# Patient Record
Sex: FEMALE | Race: BLACK OR AFRICAN AMERICAN | NOT HISPANIC OR LATINO
[De-identification: names, ages, dates, MRNs, and addresses within clinical notes are randomized per-mention and may not be internally consistent; named-entity substitution may affect disease eponyms.]

---

## 2017-06-13 ENCOUNTER — APPOINTMENT (OUTPATIENT)
Dept: FAMILY MEDICINE | Facility: CLINIC | Age: 62
End: 2017-06-13

## 2017-06-13 ENCOUNTER — MEDICATION RENEWAL (OUTPATIENT)
Age: 62
End: 2017-06-13

## 2017-06-13 VITALS
HEIGHT: 66 IN | DIASTOLIC BLOOD PRESSURE: 80 MMHG | WEIGHT: 182 LBS | SYSTOLIC BLOOD PRESSURE: 134 MMHG | BODY MASS INDEX: 29.25 KG/M2

## 2017-06-13 DIAGNOSIS — Z80.1 FAMILY HISTORY OF MALIGNANT NEOPLASM OF TRACHEA, BRONCHUS AND LUNG: ICD-10-CM

## 2017-06-13 DIAGNOSIS — H40.9 UNSPECIFIED GLAUCOMA: ICD-10-CM

## 2017-06-14 LAB
25(OH)D3 SERPL-MCNC: 22.3 NG/ML
ALBUMIN SERPL ELPH-MCNC: 4.7 G/DL
ALP BLD-CCNC: 61 U/L
ALT SERPL-CCNC: 19 U/L
ANION GAP SERPL CALC-SCNC: 17 MMOL/L
AST SERPL-CCNC: 14 U/L
BILIRUB SERPL-MCNC: 0.4 MG/DL
BUN SERPL-MCNC: 12 MG/DL
CALCIUM SERPL-MCNC: 10.5 MG/DL
CHLORIDE SERPL-SCNC: 106 MMOL/L
CHOLEST SERPL-MCNC: 215 MG/DL
CHOLEST/HDLC SERPL: 3.3 RATIO
CO2 SERPL-SCNC: 23 MMOL/L
CREAT SERPL-MCNC: 1.05 MG/DL
GLUCOSE SERPL-MCNC: 121 MG/DL
HBA1C MFR BLD HPLC: 6.7 %
HDLC SERPL-MCNC: 66 MG/DL
LDLC SERPL CALC-MCNC: 130 MG/DL
POTASSIUM SERPL-SCNC: 4 MMOL/L
PROT SERPL-MCNC: 8.4 G/DL
SODIUM SERPL-SCNC: 146 MMOL/L
TRIGL SERPL-MCNC: 95 MG/DL

## 2017-09-19 ENCOUNTER — RX RENEWAL (OUTPATIENT)
Age: 62
End: 2017-09-19

## 2020-01-07 ENCOUNTER — APPOINTMENT (OUTPATIENT)
Dept: ORTHOPEDIC SURGERY | Facility: CLINIC | Age: 65
End: 2020-01-07
Payer: COMMERCIAL

## 2020-01-07 VITALS
SYSTOLIC BLOOD PRESSURE: 142 MMHG | HEART RATE: 80 BPM | DIASTOLIC BLOOD PRESSURE: 88 MMHG | BODY MASS INDEX: 29.25 KG/M2 | WEIGHT: 182 LBS | HEIGHT: 66 IN

## 2020-01-07 DIAGNOSIS — Z78.9 OTHER SPECIFIED HEALTH STATUS: ICD-10-CM

## 2020-01-07 DIAGNOSIS — Z86.39 PERSONAL HISTORY OF OTHER ENDOCRINE, NUTRITIONAL AND METABOLIC DISEASE: ICD-10-CM

## 2020-01-07 PROCEDURE — 99204 OFFICE O/P NEW MOD 45 MIN: CPT

## 2020-01-07 NOTE — DISCUSSION/SUMMARY
[de-identified] : Patient was shown her x-rays and that of the situation detail. She was informed she has moderate arthritis of her right knee and was referred to physical therapy. She will continue taking her diclofenac gel as well as her Motrin. Patient declined a cortisone injection at this time.\par \par Followup in 3 weeks if she is still symptomatic we may consider Visco supplementation

## 2020-01-07 NOTE — HISTORY OF PRESENT ILLNESS
[Worsening] : worsening [___ mths] : [unfilled] month(s) ago [4] : a maximum pain level of 4/10 [Intermit.] : ~He/She~ states the symptoms seem to be intermittent [Walking] : worsened by walking [Heat] : relieved by heat [Rest] : relieved by rest [de-identified] : Pt presents for initial evaluation with pain in her right knee. Pt has no known injury. Pt is apply diclofenac gel for pain. Pt was seen by her PCP who ordered an xray of her right knee, taken 12/31/19 [place]. [de-identified] : medication

## 2020-01-07 NOTE — PHYSICAL EXAM
[de-identified] : Physical examination of the right knee discloses mild tenderness to the medial joint line palpation. Relatively pain-free motion between ° no acute instability or neurovascular deficits no acute effusions [de-identified] : Submitted x-rays from December 31, 2019 discloses mild to moderate medial joint space narrowing right knee

## 2020-01-13 ENCOUNTER — APPOINTMENT (OUTPATIENT)
Dept: ORTHOPEDIC SURGERY | Facility: CLINIC | Age: 65
End: 2020-01-13
Payer: COMMERCIAL

## 2020-01-13 VITALS
BODY MASS INDEX: 29.25 KG/M2 | DIASTOLIC BLOOD PRESSURE: 78 MMHG | HEART RATE: 88 BPM | WEIGHT: 182 LBS | SYSTOLIC BLOOD PRESSURE: 149 MMHG | HEIGHT: 66 IN

## 2020-01-13 DIAGNOSIS — M17.11 UNILATERAL PRIMARY OSTEOARTHRITIS, RIGHT KNEE: ICD-10-CM

## 2020-01-13 PROCEDURE — 99214 OFFICE O/P EST MOD 30 MIN: CPT | Mod: 25

## 2020-01-13 PROCEDURE — 20611 DRAIN/INJ JOINT/BURSA W/US: CPT | Mod: RT

## 2020-01-13 NOTE — PROCEDURE
[de-identified] : Under sterile technique using ultrasound guided needle placement the right knee was injected with Depo-Medrol 40 mg and lidocaine.\par \par The patient tolerated procedure well no complications occurred and she left the office in stable condition.

## 2020-01-13 NOTE — PHYSICAL EXAM
[de-identified] : On physical examination of the right knee she is Noted to have medial joint line tenderness with limited motion.

## 2020-01-13 NOTE — DISCUSSION/SUMMARY
[de-identified] : Along the patient's cortisone injection she was given appropriate post followup instructions accordingly. Continue physical therapy and will return in 3-4 weeks symptomatic. Further treatment may consist of Visco supplementation

## 2020-01-13 NOTE — HISTORY OF PRESENT ILLNESS
[Worsening] : worsening [___ mths] : [unfilled] month(s) ago [Walking] : worsened by walking [Intermit.] : ~He/She~ states the symptoms seem to be intermittent [None] : No relieving factors are noted [6] : a minimum pain level of 6/10 [de-identified] : Pt returns for follow up for pain in her right knee. 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 Pt is taking Naprosyn.

## 2020-02-03 ENCOUNTER — RESULT REVIEW (OUTPATIENT)
Age: 65
End: 2020-02-03

## 2020-04-01 ENCOUNTER — INPATIENT (INPATIENT)
Facility: HOSPITAL | Age: 65
LOS: 3 days | Discharge: HOME | DRG: 177 | End: 2020-04-05
Attending: INTERNAL MEDICINE | Admitting: HOSPITALIST
Payer: COMMERCIAL

## 2020-04-01 VITALS
OXYGEN SATURATION: 95 % | DIASTOLIC BLOOD PRESSURE: 85 MMHG | RESPIRATION RATE: 26 BRPM | SYSTOLIC BLOOD PRESSURE: 156 MMHG | HEART RATE: 122 BPM | TEMPERATURE: 101 F

## 2020-04-01 DIAGNOSIS — R68.89 OTHER GENERAL SYMPTOMS AND SIGNS: ICD-10-CM

## 2020-04-01 LAB
ALBUMIN SERPL ELPH-MCNC: 3.9 G/DL — SIGNIFICANT CHANGE UP (ref 3.3–5)
ALP SERPL-CCNC: 49 U/L — SIGNIFICANT CHANGE UP (ref 40–120)
ALT FLD-CCNC: 23 U/L — SIGNIFICANT CHANGE UP (ref 10–45)
ANION GAP SERPL CALC-SCNC: 18 MMOL/L — HIGH (ref 5–17)
APTT BLD: 35.3 SEC — SIGNIFICANT CHANGE UP (ref 27.5–36.3)
AST SERPL-CCNC: 29 U/L — SIGNIFICANT CHANGE UP (ref 10–40)
BASE EXCESS BLDV CALC-SCNC: 1.9 MMOL/L — SIGNIFICANT CHANGE UP (ref -2–2)
BASOPHILS # BLD AUTO: 0.03 K/UL — SIGNIFICANT CHANGE UP (ref 0–0.2)
BASOPHILS NFR BLD AUTO: 0.3 % — SIGNIFICANT CHANGE UP (ref 0–2)
BILIRUB SERPL-MCNC: 0.4 MG/DL — SIGNIFICANT CHANGE UP (ref 0.2–1.2)
BUN SERPL-MCNC: 21 MG/DL — SIGNIFICANT CHANGE UP (ref 7–23)
CA-I SERPL-SCNC: 1.19 MMOL/L — SIGNIFICANT CHANGE UP (ref 1.12–1.3)
CALCIUM SERPL-MCNC: 9.7 MG/DL — SIGNIFICANT CHANGE UP (ref 8.4–10.5)
CHLORIDE BLDV-SCNC: 104 MMOL/L — SIGNIFICANT CHANGE UP (ref 96–108)
CHLORIDE SERPL-SCNC: 100 MMOL/L — SIGNIFICANT CHANGE UP (ref 96–108)
CK SERPL-CCNC: 366 U/L — HIGH (ref 25–170)
CO2 BLDV-SCNC: 28 MMOL/L — SIGNIFICANT CHANGE UP (ref 22–30)
CO2 SERPL-SCNC: 22 MMOL/L — SIGNIFICANT CHANGE UP (ref 22–31)
CREAT SERPL-MCNC: 1.39 MG/DL — HIGH (ref 0.5–1.3)
CRP SERPL-MCNC: 33.55 MG/DL — HIGH (ref 0–0.4)
D DIMER BLD IA.RAPID-MCNC: 342 NG/ML DDU — HIGH
EOSINOPHIL # BLD AUTO: 0.01 K/UL — SIGNIFICANT CHANGE UP (ref 0–0.5)
EOSINOPHIL NFR BLD AUTO: 0.1 % — SIGNIFICANT CHANGE UP (ref 0–6)
ERYTHROCYTE [SEDIMENTATION RATE] IN BLOOD: 120 MM/HR — HIGH (ref 0–20)
FERRITIN SERPL-MCNC: 921 NG/ML — HIGH (ref 15–150)
GAS PNL BLDV: 141 MMOL/L — SIGNIFICANT CHANGE UP (ref 135–145)
GAS PNL BLDV: SIGNIFICANT CHANGE UP
GLUCOSE BLDV-MCNC: 153 MG/DL — HIGH (ref 70–99)
GLUCOSE SERPL-MCNC: 166 MG/DL — HIGH (ref 70–99)
HCO3 BLDV-SCNC: 26 MMOL/L — SIGNIFICANT CHANGE UP (ref 21–29)
HCT VFR BLD CALC: 41 % — SIGNIFICANT CHANGE UP (ref 34.5–45)
HCT VFR BLDA CALC: 44 % — SIGNIFICANT CHANGE UP (ref 39–50)
HGB BLD CALC-MCNC: 14.4 G/DL — SIGNIFICANT CHANGE UP (ref 11.5–15.5)
HGB BLD-MCNC: 12.9 G/DL — SIGNIFICANT CHANGE UP (ref 11.5–15.5)
IMM GRANULOCYTES NFR BLD AUTO: 1 % — SIGNIFICANT CHANGE UP (ref 0–1.5)
INR BLD: 1.31 RATIO — HIGH (ref 0.88–1.16)
LACTATE BLDV-MCNC: 2 MMOL/L — SIGNIFICANT CHANGE UP (ref 0.7–2)
LDH SERPL L TO P-CCNC: 259 U/L — HIGH (ref 50–242)
LYMPHOCYTES # BLD AUTO: 0.98 K/UL — LOW (ref 1–3.3)
LYMPHOCYTES # BLD AUTO: 8.8 % — LOW (ref 13–44)
MCHC RBC-ENTMCNC: 28 PG — SIGNIFICANT CHANGE UP (ref 27–34)
MCHC RBC-ENTMCNC: 31.5 GM/DL — LOW (ref 32–36)
MCV RBC AUTO: 89.1 FL — SIGNIFICANT CHANGE UP (ref 80–100)
MONOCYTES # BLD AUTO: 0.35 K/UL — SIGNIFICANT CHANGE UP (ref 0–0.9)
MONOCYTES NFR BLD AUTO: 3.1 % — SIGNIFICANT CHANGE UP (ref 2–14)
NEUTROPHILS # BLD AUTO: 9.68 K/UL — HIGH (ref 1.8–7.4)
NEUTROPHILS NFR BLD AUTO: 86.7 % — HIGH (ref 43–77)
NRBC # BLD: 0 /100 WBCS — SIGNIFICANT CHANGE UP (ref 0–0)
PCO2 BLDV: 43 MMHG — SIGNIFICANT CHANGE UP (ref 35–50)
PH BLDV: 7.4 — SIGNIFICANT CHANGE UP (ref 7.35–7.45)
PLATELET # BLD AUTO: 286 K/UL — SIGNIFICANT CHANGE UP (ref 150–400)
PO2 BLDV: 24 MMHG — LOW (ref 25–45)
POTASSIUM BLDV-SCNC: 3.3 MMOL/L — LOW (ref 3.5–5.3)
POTASSIUM SERPL-MCNC: 3.5 MMOL/L — SIGNIFICANT CHANGE UP (ref 3.5–5.3)
POTASSIUM SERPL-SCNC: 3.5 MMOL/L — SIGNIFICANT CHANGE UP (ref 3.5–5.3)
PROCALCITONIN SERPL-MCNC: 0.54 NG/ML — HIGH (ref 0.02–0.1)
PROT SERPL-MCNC: 8.9 G/DL — HIGH (ref 6–8.3)
PROTHROM AB SERPL-ACNC: 15.2 SEC — HIGH (ref 10–12.9)
RBC # BLD: 4.6 M/UL — SIGNIFICANT CHANGE UP (ref 3.8–5.2)
RBC # FLD: 13.2 % — SIGNIFICANT CHANGE UP (ref 10.3–14.5)
SAO2 % BLDV: 36 % — LOW (ref 67–88)
SODIUM SERPL-SCNC: 140 MMOL/L — SIGNIFICANT CHANGE UP (ref 135–145)
TROPONIN T, HIGH SENSITIVITY RESULT: 11 NG/L — SIGNIFICANT CHANGE UP (ref 0–51)
WBC # BLD: 11.16 K/UL — HIGH (ref 3.8–10.5)
WBC # FLD AUTO: 11.16 K/UL — HIGH (ref 3.8–10.5)

## 2020-04-01 PROCEDURE — 99285 EMERGENCY DEPT VISIT HI MDM: CPT

## 2020-04-01 PROCEDURE — 71045 X-RAY EXAM CHEST 1 VIEW: CPT | Mod: 26

## 2020-04-01 RX ORDER — HYDROXYCHLOROQUINE SULFATE 200 MG
TABLET ORAL
Refills: 0 | Status: DISCONTINUED | OUTPATIENT
Start: 2020-04-01 | End: 2020-04-05

## 2020-04-01 RX ORDER — ACETAMINOPHEN 500 MG
975 TABLET ORAL ONCE
Refills: 0 | Status: COMPLETED | OUTPATIENT
Start: 2020-04-01 | End: 2020-04-01

## 2020-04-01 RX ORDER — ONDANSETRON 8 MG/1
4 TABLET, FILM COATED ORAL EVERY 6 HOURS
Refills: 0 | Status: DISCONTINUED | OUTPATIENT
Start: 2020-04-01 | End: 2020-04-05

## 2020-04-01 RX ORDER — METFORMIN HYDROCHLORIDE 850 MG/1
1 TABLET ORAL
Qty: 0 | Refills: 0 | DISCHARGE

## 2020-04-01 RX ORDER — HYDROXYCHLOROQUINE SULFATE 200 MG
400 TABLET ORAL EVERY 12 HOURS
Refills: 0 | Status: COMPLETED | OUTPATIENT
Start: 2020-04-01 | End: 2020-04-02

## 2020-04-01 RX ORDER — ENOXAPARIN SODIUM 100 MG/ML
40 INJECTION SUBCUTANEOUS DAILY
Refills: 0 | Status: DISCONTINUED | OUTPATIENT
Start: 2020-04-01 | End: 2020-04-05

## 2020-04-01 RX ORDER — ATORVASTATIN CALCIUM 80 MG/1
20 TABLET, FILM COATED ORAL AT BEDTIME
Refills: 0 | Status: DISCONTINUED | OUTPATIENT
Start: 2020-04-01 | End: 2020-04-05

## 2020-04-01 RX ORDER — INSULIN LISPRO 100/ML
VIAL (ML) SUBCUTANEOUS
Refills: 0 | Status: DISCONTINUED | OUTPATIENT
Start: 2020-04-01 | End: 2020-04-05

## 2020-04-01 RX ORDER — HYDROXYCHLOROQUINE SULFATE 200 MG
200 TABLET ORAL EVERY 12 HOURS
Refills: 0 | Status: DISCONTINUED | OUTPATIENT
Start: 2020-04-02 | End: 2020-04-05

## 2020-04-01 RX ORDER — GLUCAGON INJECTION, SOLUTION 0.5 MG/.1ML
1 INJECTION, SOLUTION SUBCUTANEOUS ONCE
Refills: 0 | Status: DISCONTINUED | OUTPATIENT
Start: 2020-04-01 | End: 2020-04-05

## 2020-04-01 RX ORDER — DEXTROSE 50 % IN WATER 50 %
15 SYRINGE (ML) INTRAVENOUS ONCE
Refills: 0 | Status: DISCONTINUED | OUTPATIENT
Start: 2020-04-01 | End: 2020-04-05

## 2020-04-01 RX ORDER — SODIUM CHLORIDE 9 MG/ML
500 INJECTION INTRAMUSCULAR; INTRAVENOUS; SUBCUTANEOUS ONCE
Refills: 0 | Status: COMPLETED | OUTPATIENT
Start: 2020-04-01 | End: 2020-04-01

## 2020-04-01 RX ORDER — SODIUM CHLORIDE 9 MG/ML
1000 INJECTION, SOLUTION INTRAVENOUS
Refills: 0 | Status: DISCONTINUED | OUTPATIENT
Start: 2020-04-01 | End: 2020-04-05

## 2020-04-01 RX ORDER — ACETAMINOPHEN 500 MG
650 TABLET ORAL EVERY 4 HOURS
Refills: 0 | Status: DISCONTINUED | OUTPATIENT
Start: 2020-04-01 | End: 2020-04-05

## 2020-04-01 RX ORDER — DEXTROSE 50 % IN WATER 50 %
12.5 SYRINGE (ML) INTRAVENOUS ONCE
Refills: 0 | Status: DISCONTINUED | OUTPATIENT
Start: 2020-04-01 | End: 2020-04-05

## 2020-04-01 RX ADMIN — ATORVASTATIN CALCIUM 20 MILLIGRAM(S): 80 TABLET, FILM COATED ORAL at 22:28

## 2020-04-01 RX ADMIN — Medication 650 MILLIGRAM(S): at 22:29

## 2020-04-01 RX ADMIN — SODIUM CHLORIDE 500 MILLILITER(S): 9 INJECTION INTRAMUSCULAR; INTRAVENOUS; SUBCUTANEOUS at 15:20

## 2020-04-01 RX ADMIN — Medication 400 MILLIGRAM(S): at 22:29

## 2020-04-01 RX ADMIN — Medication 975 MILLIGRAM(S): at 15:19

## 2020-04-01 NOTE — ED PROVIDER NOTE - CHILD ABUSE FACILITY
Problem: Skin Injury Risk (Adult)  Goal: Skin Health and Integrity  Outcome: Ongoing (interventions implemented as appropriate)      Problem: Wound (Includes Pressure Injury) (Adult)  Goal: Signs and Symptoms of Listed Potential Problems Will be Absent, Minimized or Managed (Wound)  Outcome: Ongoing (interventions implemented as appropriate)      Problem: Sepsis/Septic Shock (Adult)  Goal: Signs and Symptoms of Listed Potential Problems Will be Absent, Minimized or Managed (Sepsis/Septic Shock)  Outcome: Ongoing (interventions implemented as appropriate)      Problem: Renal Failure/Kidney Injury, Acute (Adult)  Goal: Signs and Symptoms of Listed Potential Problems Will be Absent, Minimized or Managed (Renal Failure/Kidney Injury, Acute)  Outcome: Ongoing (interventions implemented as appropriate)      Problem: Fall Risk (Adult)  Goal: Identify Related Risk Factors and Signs and Symptoms  Outcome: Ongoing (interventions implemented as appropriate)    Goal: Absence of Fall  Outcome: Ongoing (interventions implemented as appropriate)      Problem: Skin and Soft Tissue Infection (Adult)  Goal: Signs and Symptoms of Listed Potential Problems Will be Absent, Minimized or Managed (Skin and Soft Tissue Infection)  Outcome: Ongoing (interventions implemented as appropriate)      Problem: Patient Care Overview  Goal: Individualization and Mutuality  Outcome: Ongoing (interventions implemented as appropriate)    Goal: Discharge Needs Assessment  Outcome: Ongoing (interventions implemented as appropriate)    Goal: Interprofessional Rounds/Family Conf  Outcome: Ongoing (interventions implemented as appropriate)      Problem: NPPV/CPAP (Adult)  Goal: Signs and Symptoms of Listed Potential Problems Will be Absent, Minimized or Managed (NPPV/CPAP)  Outcome: Ongoing (interventions implemented as appropriate)         [facility]

## 2020-04-01 NOTE — ED PROVIDER NOTE - CLINICAL SUMMARY MEDICAL DECISION MAKING FREE TEXT BOX
Attending MD Hua: 64F presenting with fevers/chills and dyspnea x 3-4 days, patient arrives tachypneic to the low 30s, ill appearing, O2 sat in mid 90s RA. Main ddx includes COVID-19 pneumonia. Plan for CXR, labs, judicious IV fluids, likely admission given work of breathing

## 2020-04-01 NOTE — H&P ADULT - ASSESSMENT
63 yo F with HTN, HLD, DM2 presented with fever and SOB, found to have pneumonia.    Viral pneumonia suspected due to COVID-19 infection with acute resp failure  - on presentation pt was hypoxic to 90% on RA, now satting 97% on 2L  - obtain baseline CPK, trop, ferritin, CRP, LDH, d-dimer, PT/INR, PTT, procalcitonin  - trend daily CBC, CMP  - trend q72 procal, CRP, ferritin  - tylenol prn for fever  - f/u nasopharyngeal COVID-19 PCR  - start plaquenil; monitor daily LFTs, Mg, if AST/ALT>3x ULN or Tbili >1.5, would d/c  - supportive care, wean off O2 as tolerated    HTN  - c/w amlodipine  - will hold off on benzapril for now, normotensive here    HLD  - c/w rosuvastatin    DM2  - takes metformin at home        DVT: lovenox ppx  Full Code      Kirby Ybarra MD  PGY-5  [phone] 63 yo F with HTN, HLD, DM2 presented with fever and SOB, found to have pneumonia.    Viral pneumonia suspected due to COVID-19 infection with acute resp failure  - on presentation pt was hypoxic to 90% on RA, now satting 97% on 2L  - obtain baseline CPK, trop, ferritin, CRP, LDH, d-dimer, PT/INR, PTT, procalcitonin  - trend daily CBC, CMP  - trend q72 procal, CRP, ferritin  - tylenol prn for fever  - f/u nasopharyngeal COVID-19 PCR  - start plaquenil due to evidence of pneumonia on CXR and O2 90% on RA -> monitor daily LFTs, Mg, if AST/ALT>3x ULN or Tbili >1.5, would d/c  - supportive care, wean off O2 as tolerated    Elevated creatinine  - Cr 1.39, no baseline available  - monitor daily    HTN  - c/w amlodipine  - will hold off on benzapril for now, normotensive here    HLD  - c/w rosuvastatin    DM2  - takes metformin at home  - on sliding scale insulin here      DVT: lovenox ppx  Full Code      Kirby Ybarra MD  PGY-5  [phone] 63 yo F with HTN, HLD, DM2 presented with fever and SOB, found to have pneumonia.    Viral pneumonia suspected due to COVID-19 infection with acute resp failure  - on presentation pt was hypoxic to 90% on RA, now satting 97% on 2L  - obtain baseline CPK, trop, ferritin, CRP, LDH, d-dimer, PT/INR, PTT, procalcitonin  - trend daily CBC, CMP  - trend q72 procal, CRP, ferritin  - tylenol prn for fever  - f/u nasopharyngeal COVID-19 PCR  - if EKG w QTC<500 (repeat pending) start plaquenil due to evidence of pneumonia on CXR and O2 90% on RA -> monitor daily LFTs, Mg, if AST/ALT>3x ULN or Tbili >1.5, would d/c  - supportive care, wean off O2 as tolerated    Elevated creatinine  - Cr 1.39, no baseline available  - monitor daily    HTN  - c/w amlodipine  - will hold off on benzapril for now, normotensive here    HLD  - c/w rosuvastatin    DM2  - takes metformin at home  - on sliding scale insulin here      DVT: lovenox ppx  Full Code      Kirby Ybarra MD  PGY-5  [phone]

## 2020-04-01 NOTE — ED ADULT NURSE REASSESSMENT NOTE - NS ED NURSE REASSESS COMMENT FT1
Received report from STEFANO Yan. Patient a/ox3, VSS, sensory/motor function intact and in NAD at this time. Patient remains tachypneic, but able to complete full sentences. Patient denies CP/SOB, f/c, n/v/d, dizziness/lightheadedness, numbness/tingling/weakness at this time. Plan of care discussed with patient. Patient verbalizes understanding. Patient currently on CM p/w NSR and 2L NC with a pulse ox of 97%. Will continue to monitor and reassess. Call bell within reach, safety and comfort measures maintained.

## 2020-04-01 NOTE — ED ADULT NURSE NOTE - OBJECTIVE STATEMENT
63 y/o female presents to the ED from home c/o fever, chills, dyspnea x 4 days. Worsening dec taste, dec po intake. 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 Denies fever, chills, n/v, weakness, abd pain, diarrhea/constipation, numbness/tingling, urinary s/s. Pt A&Ox3, in no respiratory distress, no CP, tachypneic with difficulty completing full sentences due to SOB. PT safety maintained with family at bedside, call bell within reach and bed in the lowest position.

## 2020-04-01 NOTE — ED PROVIDER NOTE - OBJECTIVE STATEMENT
64F presenting with fevers/chills and dyspnea x 3-4 days. Works as travel nurse with covid patients. Decreased po intake. No taste. No n/v/d. no cp or abd pain. no gu s/s. taking tylenol prn.

## 2020-04-01 NOTE — ED PROVIDER NOTE - PHYSICAL EXAMINATION
Physical Exam:    I have reviewed the triage vital signs.    Gen: NAD, AOx3, non-toxic appearing, able to ambulate without assistance  Head and Neck: NCAT, Neck supple without meningismus   HEENT: EOMI, PEERLA, normal conjunctiva, tongue midline, oral mucosa moist  Lung: CTAB, respiratory distress, increased work of breathing, no wheezes/rhonchi/rales B/L, speaking in short worded sentences.  CV: RRR, no murmurs, rubs or gallops  Abd: soft, NT, ND, no guarding, no rigidity, no rebound tenderness, no CVA tenderness, no masses.   MSK: no gross deformities, ROM normal in UE/LE, no back tenderness  Neuro: CNs II-XII grossly intact. No focal sensory or motor deficits  Skin: Warm, well perfused, no rash, no leg swelling  Psych: Appropriate mood and affect

## 2020-04-01 NOTE — H&P ADULT - HISTORY OF PRESENT ILLNESS
63 yo F with HTN, HLD, DM2 presents with fever and dyspnea. 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 About 4-5 days ago, she started to feel short of breath. She has had no cough. Two days ago she also developed nausea and diarrhea. She has had a low appetite because of the nausea. She denies CP, palp, dizziness, syncope.

## 2020-04-02 DIAGNOSIS — I10 ESSENTIAL (PRIMARY) HYPERTENSION: ICD-10-CM

## 2020-04-02 DIAGNOSIS — E11.9 TYPE 2 DIABETES MELLITUS WITHOUT COMPLICATIONS: ICD-10-CM

## 2020-04-02 DIAGNOSIS — R68.89 OTHER GENERAL SYMPTOMS AND SIGNS: ICD-10-CM

## 2020-04-02 LAB
ALBUMIN SERPL ELPH-MCNC: 3.1 G/DL — LOW (ref 3.3–5)
ALP SERPL-CCNC: 43 U/L — SIGNIFICANT CHANGE UP (ref 40–120)
ALT FLD-CCNC: 22 U/L — SIGNIFICANT CHANGE UP (ref 10–45)
ANION GAP SERPL CALC-SCNC: 13 MMOL/L — SIGNIFICANT CHANGE UP (ref 5–17)
AST SERPL-CCNC: 29 U/L — SIGNIFICANT CHANGE UP (ref 10–40)
BASOPHILS # BLD AUTO: 0.04 K/UL — SIGNIFICANT CHANGE UP (ref 0–0.2)
BASOPHILS NFR BLD AUTO: 0.5 % — SIGNIFICANT CHANGE UP (ref 0–2)
BILIRUB SERPL-MCNC: 0.4 MG/DL — SIGNIFICANT CHANGE UP (ref 0.2–1.2)
BUN SERPL-MCNC: 17 MG/DL — SIGNIFICANT CHANGE UP (ref 7–23)
CALCIUM SERPL-MCNC: 9.4 MG/DL — SIGNIFICANT CHANGE UP (ref 8.4–10.5)
CHLORIDE SERPL-SCNC: 103 MMOL/L — SIGNIFICANT CHANGE UP (ref 96–108)
CO2 SERPL-SCNC: 24 MMOL/L — SIGNIFICANT CHANGE UP (ref 22–31)
CREAT SERPL-MCNC: 1.06 MG/DL — SIGNIFICANT CHANGE UP (ref 0.5–1.3)
CRP SERPL-MCNC: 26.75 MG/DL — HIGH (ref 0–0.4)
EOSINOPHIL # BLD AUTO: 0.06 K/UL — SIGNIFICANT CHANGE UP (ref 0–0.5)
EOSINOPHIL NFR BLD AUTO: 0.8 % — SIGNIFICANT CHANGE UP (ref 0–6)
GLUCOSE BLDC GLUCOMTR-MCNC: 110 MG/DL — HIGH (ref 70–99)
GLUCOSE BLDC GLUCOMTR-MCNC: 123 MG/DL — HIGH (ref 70–99)
GLUCOSE SERPL-MCNC: 108 MG/DL — HIGH (ref 70–99)
HBA1C BLD-MCNC: 7.2 % — HIGH (ref 4–5.6)
HCT VFR BLD CALC: 38.8 % — SIGNIFICANT CHANGE UP (ref 34.5–45)
HCV AB S/CO SERPL IA: 0.13 S/CO — SIGNIFICANT CHANGE UP (ref 0–0.99)
HCV AB SERPL-IMP: SIGNIFICANT CHANGE UP
HGB BLD-MCNC: 12.1 G/DL — SIGNIFICANT CHANGE UP (ref 11.5–15.5)
IMM GRANULOCYTES NFR BLD AUTO: 1.6 % — HIGH (ref 0–1.5)
LYMPHOCYTES # BLD AUTO: 1.54 K/UL — SIGNIFICANT CHANGE UP (ref 1–3.3)
LYMPHOCYTES # BLD AUTO: 20.1 % — SIGNIFICANT CHANGE UP (ref 13–44)
MCHC RBC-ENTMCNC: 28 PG — SIGNIFICANT CHANGE UP (ref 27–34)
MCHC RBC-ENTMCNC: 31.2 GM/DL — LOW (ref 32–36)
MCV RBC AUTO: 89.8 FL — SIGNIFICANT CHANGE UP (ref 80–100)
MONOCYTES # BLD AUTO: 0.32 K/UL — SIGNIFICANT CHANGE UP (ref 0–0.9)
MONOCYTES NFR BLD AUTO: 4.2 % — SIGNIFICANT CHANGE UP (ref 2–14)
NEUTROPHILS # BLD AUTO: 5.58 K/UL — SIGNIFICANT CHANGE UP (ref 1.8–7.4)
NEUTROPHILS NFR BLD AUTO: 72.8 % — SIGNIFICANT CHANGE UP (ref 43–77)
NRBC # BLD: 0 /100 WBCS — SIGNIFICANT CHANGE UP (ref 0–0)
PLATELET # BLD AUTO: 265 K/UL — SIGNIFICANT CHANGE UP (ref 150–400)
POTASSIUM SERPL-MCNC: 3.9 MMOL/L — SIGNIFICANT CHANGE UP (ref 3.5–5.3)
POTASSIUM SERPL-SCNC: 3.9 MMOL/L — SIGNIFICANT CHANGE UP (ref 3.5–5.3)
PROT SERPL-MCNC: 7.8 G/DL — SIGNIFICANT CHANGE UP (ref 6–8.3)
RBC # BLD: 4.32 M/UL — SIGNIFICANT CHANGE UP (ref 3.8–5.2)
RBC # FLD: 13.2 % — SIGNIFICANT CHANGE UP (ref 10.3–14.5)
SARS-COV-2 RNA SPEC QL NAA+PROBE: DETECTED
SODIUM SERPL-SCNC: 140 MMOL/L — SIGNIFICANT CHANGE UP (ref 135–145)
WBC # BLD: 7.66 K/UL — SIGNIFICANT CHANGE UP (ref 3.8–10.5)
WBC # FLD AUTO: 7.66 K/UL — SIGNIFICANT CHANGE UP (ref 3.8–10.5)

## 2020-04-02 RX ORDER — ANAKINRA 100MG/0.67
100 SYRINGE (ML) SUBCUTANEOUS
Refills: 0 | Status: COMPLETED | OUTPATIENT
Start: 2020-04-02 | End: 2020-04-05

## 2020-04-02 RX ORDER — AZITHROMYCIN 500 MG/1
500 TABLET, FILM COATED ORAL ONCE
Refills: 0 | Status: COMPLETED | OUTPATIENT
Start: 2020-04-02 | End: 2020-04-02

## 2020-04-02 RX ORDER — AZITHROMYCIN 500 MG/1
250 TABLET, FILM COATED ORAL DAILY
Refills: 0 | Status: DISCONTINUED | OUTPATIENT
Start: 2020-04-03 | End: 2020-04-04

## 2020-04-02 RX ADMIN — ATORVASTATIN CALCIUM 20 MILLIGRAM(S): 80 TABLET, FILM COATED ORAL at 21:50

## 2020-04-02 RX ADMIN — Medication 400 MILLIGRAM(S): at 15:38

## 2020-04-02 RX ADMIN — Medication 40 MILLIGRAM(S): at 21:50

## 2020-04-02 RX ADMIN — Medication 100 MILLIGRAM(S): at 17:25

## 2020-04-02 RX ADMIN — AZITHROMYCIN 500 MILLIGRAM(S): 500 TABLET, FILM COATED ORAL at 21:50

## 2020-04-02 RX ADMIN — ONDANSETRON 4 MILLIGRAM(S): 8 TABLET, FILM COATED ORAL at 09:22

## 2020-04-02 RX ADMIN — ENOXAPARIN SODIUM 40 MILLIGRAM(S): 100 INJECTION SUBCUTANEOUS at 15:38

## 2020-04-02 RX ADMIN — Medication 100 MILLIGRAM(S): at 21:50

## 2020-04-02 RX ADMIN — Medication 200 MILLIGRAM(S): at 21:50

## 2020-04-02 NOTE — PROGRESS NOTE ADULT - PROBLEM SELECTOR PLAN 2
- c/w amlodipine  - will hold off on benzapril for now, normotensive here  continue to monitor BP and adjust meds as needed

## 2020-04-02 NOTE — PROGRESS NOTE ADULT - PROBLEM SELECTOR PLAN 1
Patient positive for Covid 19  started on Plaquenil will start zithromax   steroids to be started   cough syrup as needed  continue supplemental O2 and follow O2 sats

## 2020-04-03 LAB
ALBUMIN SERPL ELPH-MCNC: 3.5 G/DL — SIGNIFICANT CHANGE UP (ref 3.3–5)
ALP SERPL-CCNC: 46 U/L — SIGNIFICANT CHANGE UP (ref 40–120)
ALT FLD-CCNC: 30 U/L — SIGNIFICANT CHANGE UP (ref 10–45)
ANION GAP SERPL CALC-SCNC: 15 MMOL/L — SIGNIFICANT CHANGE UP (ref 5–17)
AST SERPL-CCNC: 38 U/L — SIGNIFICANT CHANGE UP (ref 10–40)
BILIRUB SERPL-MCNC: 0.4 MG/DL — SIGNIFICANT CHANGE UP (ref 0.2–1.2)
BUN SERPL-MCNC: 16 MG/DL — SIGNIFICANT CHANGE UP (ref 7–23)
CALCIUM SERPL-MCNC: 10 MG/DL — SIGNIFICANT CHANGE UP (ref 8.4–10.5)
CHLORIDE SERPL-SCNC: 99 MMOL/L — SIGNIFICANT CHANGE UP (ref 96–108)
CO2 SERPL-SCNC: 23 MMOL/L — SIGNIFICANT CHANGE UP (ref 22–31)
CREAT SERPL-MCNC: 0.92 MG/DL — SIGNIFICANT CHANGE UP (ref 0.5–1.3)
GLUCOSE BLDC GLUCOMTR-MCNC: 157 MG/DL — HIGH (ref 70–99)
GLUCOSE BLDC GLUCOMTR-MCNC: 185 MG/DL — HIGH (ref 70–99)
GLUCOSE BLDC GLUCOMTR-MCNC: 211 MG/DL — HIGH (ref 70–99)
GLUCOSE BLDC GLUCOMTR-MCNC: 233 MG/DL — HIGH (ref 70–99)
GLUCOSE BLDC GLUCOMTR-MCNC: 235 MG/DL — HIGH (ref 70–99)
GLUCOSE BLDC GLUCOMTR-MCNC: 264 MG/DL — HIGH (ref 70–99)
GLUCOSE SERPL-MCNC: 245 MG/DL — HIGH (ref 70–99)
HCT VFR BLD CALC: 37.6 % — SIGNIFICANT CHANGE UP (ref 34.5–45)
HGB BLD-MCNC: 12.4 G/DL — SIGNIFICANT CHANGE UP (ref 11.5–15.5)
MCHC RBC-ENTMCNC: 29.1 PG — SIGNIFICANT CHANGE UP (ref 27–34)
MCHC RBC-ENTMCNC: 33 GM/DL — SIGNIFICANT CHANGE UP (ref 32–36)
MCV RBC AUTO: 88.3 FL — SIGNIFICANT CHANGE UP (ref 80–100)
NRBC # BLD: 0 /100 WBCS — SIGNIFICANT CHANGE UP (ref 0–0)
PLATELET # BLD AUTO: 292 K/UL — SIGNIFICANT CHANGE UP (ref 150–400)
POTASSIUM SERPL-MCNC: 4.4 MMOL/L — SIGNIFICANT CHANGE UP (ref 3.5–5.3)
POTASSIUM SERPL-SCNC: 4.4 MMOL/L — SIGNIFICANT CHANGE UP (ref 3.5–5.3)
PROT SERPL-MCNC: 8.1 G/DL — SIGNIFICANT CHANGE UP (ref 6–8.3)
RBC # BLD: 4.26 M/UL — SIGNIFICANT CHANGE UP (ref 3.8–5.2)
RBC # FLD: 13.1 % — SIGNIFICANT CHANGE UP (ref 10.3–14.5)
SODIUM SERPL-SCNC: 137 MMOL/L — SIGNIFICANT CHANGE UP (ref 135–145)
WBC # BLD: 4.93 K/UL — SIGNIFICANT CHANGE UP (ref 3.8–10.5)
WBC # FLD AUTO: 4.93 K/UL — SIGNIFICANT CHANGE UP (ref 3.8–10.5)

## 2020-04-03 RX ORDER — INSULIN LISPRO 100/ML
VIAL (ML) SUBCUTANEOUS AT BEDTIME
Refills: 0 | Status: DISCONTINUED | OUTPATIENT
Start: 2020-04-03 | End: 2020-04-05

## 2020-04-03 RX ADMIN — Medication 100 MILLIGRAM(S): at 03:06

## 2020-04-03 RX ADMIN — Medication 100 MILLIGRAM(S): at 10:45

## 2020-04-03 RX ADMIN — AZITHROMYCIN 250 MILLIGRAM(S): 500 TABLET, FILM COATED ORAL at 10:45

## 2020-04-03 RX ADMIN — ATORVASTATIN CALCIUM 20 MILLIGRAM(S): 80 TABLET, FILM COATED ORAL at 23:20

## 2020-04-03 RX ADMIN — Medication 40 MILLIGRAM(S): at 06:10

## 2020-04-03 RX ADMIN — Medication 100 MILLIGRAM(S): at 17:30

## 2020-04-03 RX ADMIN — ENOXAPARIN SODIUM 40 MILLIGRAM(S): 100 INJECTION SUBCUTANEOUS at 10:45

## 2020-04-03 RX ADMIN — Medication 200 MILLIGRAM(S): at 23:19

## 2020-04-03 RX ADMIN — Medication 2: at 14:22

## 2020-04-03 RX ADMIN — Medication 100 MILLIGRAM(S): at 22:55

## 2020-04-03 RX ADMIN — Medication 1: at 10:45

## 2020-04-03 RX ADMIN — Medication 2: at 18:55

## 2020-04-03 RX ADMIN — Medication 200 MILLIGRAM(S): at 10:45

## 2020-04-03 RX ADMIN — Medication 40 MILLIGRAM(S): at 17:30

## 2020-04-03 NOTE — PROGRESS NOTE ADULT - PROBLEM SELECTOR PLAN 1
Patient positive for Covid 19  started on Plaquenil will start zithromax   steroids to be started  started on kineret with improvement in symptoms will complete 3 day course   cough syrup as needed  continue supplemental O2 and follow O2 sats  physical therapy as tolerated

## 2020-04-03 NOTE — CHART NOTE - NSCHARTNOTEFT_GEN_A_CORE
Biotel Monitoring Note:    COVID Patient on hydroxychloroquine    Biotel Reading on: 4/2/2020  QTc measurement: 415ms

## 2020-04-03 NOTE — PROGRESS NOTE ADULT - ATTENDING COMMENTS
I am a non participating [facility] physician seeing Pt in coverage for Dr Nolan. The above patient examination was reviewed with Dr. Phillips and I agree with his evaluation, assessment and treatment plan.  Juan J Nolan M.D.

## 2020-04-04 LAB
GLUCOSE BLDC GLUCOMTR-MCNC: 184 MG/DL — HIGH (ref 70–99)
GLUCOSE BLDC GLUCOMTR-MCNC: 196 MG/DL — HIGH (ref 70–99)
GLUCOSE BLDC GLUCOMTR-MCNC: 267 MG/DL — HIGH (ref 70–99)
GLUCOSE BLDC GLUCOMTR-MCNC: 289 MG/DL — HIGH (ref 70–99)

## 2020-04-04 RX ADMIN — Medication 40 MILLIGRAM(S): at 17:08

## 2020-04-04 RX ADMIN — Medication 200 MILLIGRAM(S): at 22:08

## 2020-04-04 RX ADMIN — AZITHROMYCIN 250 MILLIGRAM(S): 500 TABLET, FILM COATED ORAL at 13:17

## 2020-04-04 RX ADMIN — ENOXAPARIN SODIUM 40 MILLIGRAM(S): 100 INJECTION SUBCUTANEOUS at 13:17

## 2020-04-04 RX ADMIN — Medication 100 MILLIGRAM(S): at 17:08

## 2020-04-04 RX ADMIN — Medication 3: at 13:17

## 2020-04-04 RX ADMIN — Medication 1: at 19:06

## 2020-04-04 RX ADMIN — Medication 40 MILLIGRAM(S): at 04:32

## 2020-04-04 RX ADMIN — Medication 100 MILLIGRAM(S): at 09:26

## 2020-04-04 RX ADMIN — Medication 1: at 09:26

## 2020-04-04 RX ADMIN — ATORVASTATIN CALCIUM 20 MILLIGRAM(S): 80 TABLET, FILM COATED ORAL at 22:08

## 2020-04-04 RX ADMIN — Medication 100 MILLIGRAM(S): at 22:08

## 2020-04-04 RX ADMIN — Medication 200 MILLIGRAM(S): at 09:26

## 2020-04-04 RX ADMIN — Medication 100 MILLIGRAM(S): at 04:32

## 2020-04-04 RX ADMIN — Medication 2: at 23:00

## 2020-04-04 NOTE — PHYSICAL THERAPY INITIAL EVALUATION ADULT - PLANNED THERAPY INTERVENTIONS, PT EVAL
1. GOAL: Pt will be able to negotiate 5 steps +HR independently with reciprocal pattern in 3 weeks./gait training/transfer training/bed mobility training

## 2020-04-04 NOTE — CHART NOTE - NSCHARTNOTEFT_GEN_A_CORE
COVID-19 4/1  Hydroxychloroquine is reasonable since she was 92% on room air at one point but please stop Azithromycin   Appears to be a candidate for either trial but discharging home if remains well on room air seems more appropriate     Spoke with DULCE Vincent MD   Infectious Disease   Pager [phone]   After 5PM and on weekends please page fellow on call or call [phone]

## 2020-04-04 NOTE — PROGRESS NOTE ADULT - SUBJECTIVE AND OBJECTIVE BOX
65 yo F with HTN, HLD, DM2 presents with fever and dyspnea. 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 About 4-5 days ago, she started to feel short of breath. She has had no cough. Two days ago she also developed nausea and diarrhea. She has had a low appetite because of the nausea. She denies CP, palp, dizziness, syncope. 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 Patient seen working with physical therapy today.     MEDICATIONS  (STANDING):  anakinra Injectable 100 milliGRAM(s) SubCutaneous <User Schedule>  atorvastatin 20 milliGRAM(s) Oral at bedtime  dextrose 5%. 1000 milliLiter(s) (50 mL/Hr) IV Continuous <Continuous>  dextrose 50% Injectable 12.5 Gram(s) IV Push once  enoxaparin Injectable 40 milliGRAM(s) SubCutaneous daily  hydroxychloroquine 200 milliGRAM(s) Oral every 12 hours  hydroxychloroquine   Oral   insulin lispro (HumaLOG) corrective regimen sliding scale   SubCutaneous three times a day before meals  insulin lispro (HumaLOG) corrective regimen sliding scale   SubCutaneous at bedtime  methylPREDNISolone sodium succinate Injectable 40 milliGRAM(s) IV Push every 12 hours    MEDICATIONS  (PRN):  acetaminophen   Tablet .. 650 milliGRAM(s) Oral every 4 hours PRN Temp greater or equal to 38.5C (101.3F)  dextrose 40% Gel 15 Gram(s) Oral once PRN Blood Glucose LESS THAN 70 milliGRAM(s)/deciliter  glucagon  Injectable 1 milliGRAM(s) IntraMuscular once PRN Glucose LESS THAN 70 milligrams/deciliter  ondansetron Injectable 4 milliGRAM(s) IV Push every 6 hours PRN Nausea and/or Vomiting          VITALS:   T(C): 36.9 (04-04-20 @ 08:40), Max: 36.9 (04-04-20 @ 08:40)  HR: 90 (04-04-20 @ 13:35) (70 - 90)  BP: 156/88 (04-04-20 @ 08:40) (134/76 - 156/88)  RR: 20 (04-04-20 @ 08:40) (20 - 20)  SpO2: 94% (04-04-20 @ 13:35) (94% - 96%)  Wt(kg): --    PHYSICAL EXAM:  GENERAL: NAD, well nourished and conversant  HEAD:  Atraumatic  EYES: EOM, PERRLA, conjunctiva pink and sclera white  ENT: No tonsillar erythema, exudates, or enlargement, moist mucous membranes, good dentition, no lesions  NECK: Supple, No JVD, normal thyroid, carotids with normal upstrokes and no bruits  CHEST/LUNG: soft rhonchi with decreased breath ssounds  HEART: Regular rate and rhythm, No murmurs, rubs, or gallops  ABDOMEN: Soft, nondistended, no masses, guarding, tenderness or rebound, bowel sounds present  EXTREMITIES:  2+ Peripheral Pulses, No clubbing, cyanosis, or edema.   LYMPH: No lymphadenopathy noted  SKIN: No rashes or lesions  NERVOUS SYSTEM:  Alert & Oriented X3, normal cognitive function. Motor Strength 5/5 right upper and right lower.  5/5 left upper and left lower extremities, DTRs 2+ intact and symmetric      LABS:        CBC Full  -  ( 03 Apr 2020 07:53 )  WBC Count : 4.93 K/uL  RBC Count : 4.26 M/uL  Hemoglobin : 12.4 g/dL  Hematocrit : 37.6 %  Platelet Count - Automated : 292 K/uL  Mean Cell Volume : 88.3 fl  Mean Cell Hemoglobin : 29.1 pg  Mean Cell Hemoglobin Concentration : 33.0 gm/dL  Auto Neutrophil # : x  Auto Lymphocyte # : x  Auto Monocyte # : x  Auto Eosinophil # : x  Auto Basophil # : x  Auto Neutrophil % : x  Auto Lymphocyte % : x  Auto Monocyte % : x  Auto Eosinophil % : x  Auto Basophil % : x    04-03    137  |  99  |  16  ----------------------------<  245<H>  4.4   |  23  |  0.92    Ca    10.0      03 Apr 2020 07:53    TPro  8.1  /  Alb  3.5  /  TBili  0.4  /  DBili  x   /  AST  38  /  ALT  30  /  AlkPhos  46  04-03    LIVER FUNCTIONS - ( 03 Apr 2020 07:53 )  Alb: 3.5 g/dL / Pro: 8.1 g/dL / ALK PHOS: 46 U/L / ALT: 30 U/L / AST: 38 U/L / GGT: x               CAPILLARY BLOOD GLUCOSE      POCT Blood Glucose.: 184 mg/dL (04 Apr 2020 18:23)  POCT Blood Glucose.: 267 mg/dL (04 Apr 2020 12:26)  POCT Blood Glucose.: 196 mg/dL (04 Apr 2020 08:53)  POCT Blood Glucose.: 235 mg/dL (03 Apr 2020 23:36)  POCT Blood Glucose.: 264 mg/dL (03 Apr 2020 22:22)      RADIOLOGY & ADDITIONAL TESTS:

## 2020-04-04 NOTE — PHYSICAL THERAPY INITIAL EVALUATION ADULT - ACTIVE RANGE OF MOTION EXAMINATION, REHAB EVAL
no Active ROM deficits were identified/bilateral upper extremity Active ROM was WFL (within functional limits)/bilateral  lower extremity Active ROM was WFL (within functional limits)

## 2020-04-04 NOTE — PHYSICAL THERAPY INITIAL EVALUATION ADULT - PERTINENT HX OF CURRENT PROBLEM, REHAB EVAL
Pt is a 63 y/o F with PMH of HTN & DM2 presented to [facility] with fever and dyspnea. 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 About 4-5 days ago, she started to feel SOB. She reported no cough. Two days ago she also developed nausea and diarrhea. She has had a low appetite because of the nausea. Currently complaining of fatigue and weakness. Found to be + COVID-19.

## 2020-04-04 NOTE — PROGRESS NOTE ADULT - ATTENDING COMMENTS
I am a non participating [facility] physician seeing Pt in coverage for Dr Nolan. The above patient examination was reviewed with Dr. Phillips and I agree with his evaluation, assessment and treatment plan.  Juan J Nolan M.D.

## 2020-04-04 NOTE — PROGRESS NOTE ADULT - PROBLEM SELECTOR PLAN 1
Patient positive for Covid 19  started on Plaquenil will start zithromax   steroids to be started  started on kineret with improvement in symptoms will complete 3 day course   cough syrup as needed  continue supplemental O2 and follow O2 sats  physical therapy as tolerated  Patient progressing well  DC planning home

## 2020-04-04 NOTE — PHYSICAL THERAPY INITIAL EVALUATION ADULT - ADDITIONAL COMMENTS
Pt states she lives in a private home with her family. There are 4-5 EDA with +handrail. There is a full flight to the upstairs bedroom, however pt reports she will be staying on the first floor. She was independent with all functional mobility and ADL's without the use of an AD prior. Pt currently still works as an RN.

## 2020-04-05 ENCOUNTER — TRANSCRIPTION ENCOUNTER (OUTPATIENT)
Age: 65
End: 2020-04-05

## 2020-04-05 VITALS
TEMPERATURE: 98 F | HEART RATE: 91 BPM | OXYGEN SATURATION: 96 % | DIASTOLIC BLOOD PRESSURE: 79 MMHG | RESPIRATION RATE: 20 BRPM | SYSTOLIC BLOOD PRESSURE: 147 MMHG

## 2020-04-05 LAB
GLUCOSE BLDC GLUCOMTR-MCNC: 207 MG/DL — HIGH (ref 70–99)
GLUCOSE BLDC GLUCOMTR-MCNC: 302 MG/DL — HIGH (ref 70–99)

## 2020-04-05 PROCEDURE — 82728 ASSAY OF FERRITIN: CPT

## 2020-04-05 PROCEDURE — 82435 ASSAY OF BLOOD CHLORIDE: CPT

## 2020-04-05 PROCEDURE — 87635 SARS-COV-2 COVID-19 AMP PRB: CPT

## 2020-04-05 PROCEDURE — 84145 PROCALCITONIN (PCT): CPT

## 2020-04-05 PROCEDURE — 85610 PROTHROMBIN TIME: CPT

## 2020-04-05 PROCEDURE — 86803 HEPATITIS C AB TEST: CPT

## 2020-04-05 PROCEDURE — 82962 GLUCOSE BLOOD TEST: CPT

## 2020-04-05 PROCEDURE — 85014 HEMATOCRIT: CPT

## 2020-04-05 PROCEDURE — 97161 PT EVAL LOW COMPLEX 20 MIN: CPT

## 2020-04-05 PROCEDURE — 83036 HEMOGLOBIN GLYCOSYLATED A1C: CPT

## 2020-04-05 PROCEDURE — 99285 EMERGENCY DEPT VISIT HI MDM: CPT | Mod: 25

## 2020-04-05 PROCEDURE — 84484 ASSAY OF TROPONIN QUANT: CPT

## 2020-04-05 PROCEDURE — 82330 ASSAY OF CALCIUM: CPT

## 2020-04-05 PROCEDURE — 85027 COMPLETE CBC AUTOMATED: CPT

## 2020-04-05 PROCEDURE — 86140 C-REACTIVE PROTEIN: CPT

## 2020-04-05 PROCEDURE — 99238 HOSP IP/OBS DSCHRG MGMT 30/<: CPT

## 2020-04-05 PROCEDURE — 80053 COMPREHEN METABOLIC PANEL: CPT

## 2020-04-05 PROCEDURE — 83605 ASSAY OF LACTIC ACID: CPT

## 2020-04-05 PROCEDURE — 83615 LACTATE (LD) (LDH) ENZYME: CPT

## 2020-04-05 PROCEDURE — 85379 FIBRIN DEGRADATION QUANT: CPT

## 2020-04-05 PROCEDURE — 84295 ASSAY OF SERUM SODIUM: CPT

## 2020-04-05 PROCEDURE — 85652 RBC SED RATE AUTOMATED: CPT

## 2020-04-05 PROCEDURE — 93005 ELECTROCARDIOGRAM TRACING: CPT

## 2020-04-05 PROCEDURE — 85730 THROMBOPLASTIN TIME PARTIAL: CPT

## 2020-04-05 PROCEDURE — 82803 BLOOD GASES ANY COMBINATION: CPT

## 2020-04-05 PROCEDURE — 84132 ASSAY OF SERUM POTASSIUM: CPT

## 2020-04-05 PROCEDURE — 82947 ASSAY GLUCOSE BLOOD QUANT: CPT

## 2020-04-05 PROCEDURE — 71045 X-RAY EXAM CHEST 1 VIEW: CPT

## 2020-04-05 PROCEDURE — 82550 ASSAY OF CK (CPK): CPT

## 2020-04-05 RX ORDER — ROSUVASTATIN CALCIUM 5 MG/1
1 TABLET ORAL
Qty: 0 | Refills: 0 | DISCHARGE

## 2020-04-05 RX ORDER — METFORMIN HYDROCHLORIDE 850 MG/1
1 TABLET ORAL
Qty: 0 | Refills: 0 | DISCHARGE

## 2020-04-05 RX ORDER — ACETAMINOPHEN 500 MG
2 TABLET ORAL
Qty: 0 | Refills: 0 | DISCHARGE
Start: 2020-04-05

## 2020-04-05 RX ORDER — AMLODIPINE BESYLATE AND BENAZEPRIL HYDROCHLORIDE 10; 20 MG/1; MG/1
1 CAPSULE ORAL
Qty: 0 | Refills: 0 | DISCHARGE

## 2020-04-05 RX ORDER — HYDROXYCHLOROQUINE SULFATE 200 MG
1 TABLET ORAL
Qty: 2 | Refills: 0
Start: 2020-04-05 | End: 2020-04-05

## 2020-04-05 RX ADMIN — Medication 4: at 13:04

## 2020-04-05 RX ADMIN — Medication 2: at 09:44

## 2020-04-05 RX ADMIN — Medication 100 MILLIGRAM(S): at 08:50

## 2020-04-05 RX ADMIN — ENOXAPARIN SODIUM 40 MILLIGRAM(S): 100 INJECTION SUBCUTANEOUS at 13:04

## 2020-04-05 RX ADMIN — Medication 40 MILLIGRAM(S): at 04:10

## 2020-04-05 RX ADMIN — Medication 100 MILLIGRAM(S): at 04:10

## 2020-04-05 RX ADMIN — Medication 200 MILLIGRAM(S): at 08:50

## 2020-04-05 NOTE — DISCHARGE NOTE NURSING/CASE MANAGEMENT/SOCIAL WORK - PATIENT PORTAL LINK FT
You can access the FollowMyHealth Patient Portal offered by [facility] by registering at the following website: [email or web address]. By joining [facility]’s FollowMyHealth portal, you will also be able to view your health information using other applications (apps) compatible with our system.

## 2020-04-05 NOTE — PROGRESS NOTE ADULT - SUBJECTIVE AND OBJECTIVE BOX
63 yo F with HTN, HLD, DM2 presents with fever and dyspnea. 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 About 4-5 days ago, she started to feel short of breath. She has had no cough. Two days ago she also developed nausea and diarrhea. She has had a low appetite because of the nausea. She denies CP, palp, dizziness, syncope. 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 Patient states she has been feeling well. Has been tolerating RA     MEDICATIONS  (STANDING):  atorvastatin 20 milliGRAM(s) Oral at bedtime  dextrose 5%. 1000 milliLiter(s) (50 mL/Hr) IV Continuous <Continuous>  dextrose 50% Injectable 12.5 Gram(s) IV Push once  enoxaparin Injectable 40 milliGRAM(s) SubCutaneous daily  hydroxychloroquine 200 milliGRAM(s) Oral every 12 hours  hydroxychloroquine   Oral   insulin lispro (HumaLOG) corrective regimen sliding scale   SubCutaneous three times a day before meals  insulin lispro (HumaLOG) corrective regimen sliding scale   SubCutaneous at bedtime  methylPREDNISolone sodium succinate Injectable 40 milliGRAM(s) IV Push every 12 hours    MEDICATIONS  (PRN):  acetaminophen   Tablet .. 650 milliGRAM(s) Oral every 4 hours PRN Temp greater or equal to 38.5C (101.3F)  dextrose 40% Gel 15 Gram(s) Oral once PRN Blood Glucose LESS THAN 70 milliGRAM(s)/deciliter  glucagon  Injectable 1 milliGRAM(s) IntraMuscular once PRN Glucose LESS THAN 70 milligrams/deciliter  ondansetron Injectable 4 milliGRAM(s) IV Push every 6 hours PRN Nausea and/or Vomiting          VITALS:   T(C): 36.9 (04-05-20 @ 12:45), Max: 36.9 (04-05-20 @ 12:45)  HR: 91 (04-05-20 @ 12:45) (72 - 91)  BP: 147/79 (04-05-20 @ 12:45) (147/79 - 153/88)  RR: 20 (04-05-20 @ 12:45) (20 - 20)  SpO2: 96% (04-05-20 @ 12:45) (95% - 96%)  Wt(kg): --      PHYSICAL EXAM:  GENERAL: NAD, well nourished and conversant  HEAD:  Atraumatic  EYES: EOM, PERRLA, conjunctiva pink and sclera white  ENT: No tonsillar erythema, exudates, or enlargement, moist mucous membranes, good dentition, no lesions  NECK: Supple, No JVD, normal thyroid, carotids with normal upstrokes and no bruits  CHEST/LUNG: soft rhonchi with decreased breath ssounds  HEART: Regular rate and rhythm, No murmurs, rubs, or gallops  ABDOMEN: Soft, nondistended, no masses, guarding, tenderness or rebound, bowel sounds present  EXTREMITIES:  2+ Peripheral Pulses, No clubbing, cyanosis, or edema.   LYMPH: No lymphadenopathy noted  SKIN: No rashes or lesions  NERVOUS SYSTEM:  Alert & Oriented X3, normal cognitive function. Motor Strength 5/5 right upper and right lower.  5/5 left upper and left lower extremities, DTRs 2+ intact and symmetric  LABS:                      CAPILLARY BLOOD GLUCOSE      POCT Blood Glucose.: 302 mg/dL (05 Apr 2020 12:14)  POCT Blood Glucose.: 207 mg/dL (05 Apr 2020 08:54)  POCT Blood Glucose.: 289 mg/dL (04 Apr 2020 22:48)  POCT Blood Glucose.: 184 mg/dL (04 Apr 2020 18:23)      RADIOLOGY & ADDITIONAL TESTS:

## 2020-04-05 NOTE — DISCHARGE NOTE PROVIDER - HOSPITAL COURSE
63 yo F with HTN, HLD, DM2 presented with fever and SOB, found to have pneumonia.        ·  Problem: Suspected 2019 novel coronavirus infection.  Plan: DC planning home today    continue home meds    patient progressing well.           Problem: Hypertension.  Plan: BP is well controlled on current meds    will continue to monitor    continue a low sodium diet.     ·  Problem: Diabetes.  Plan: continue current insulin coverage    consistent carbohydrate diet.             Pt stable dc home with outpatient follow up with PMD.

## 2020-04-05 NOTE — DISCHARGE NOTE PROVIDER - CARE PROVIDER_API CALL
Bryant Phillips ()  [facility]  [address] [place] [address]  Phone: [phone]  Fax: [phone]  Follow Up Time:

## 2020-04-05 NOTE — PROGRESS NOTE ADULT - ATTENDING COMMENTS
DC home  continue current meds  PO as tolerated   activity as tolerated  follow up with PMD  Patient aware of plan    I am a non participating [employer] physician seeing Pt in coverage for Dr Nolan. The above patient examination was reviewed with Dr. Phillips and I agree with his evaluation, assessment and treatment plan.  Juan J Nolan M.D.

## 2020-04-05 NOTE — DISCHARGE NOTE PROVIDER - NSDCMRMEDTOKEN_GEN_ALL_CORE_FT
acetaminophen 325 mg oral tablet: 2 tab(s) orally every 4 hours, As needed, Temp greater or equal to 38.5C (101.3F)  amlodipine-benazepril 10 mg-20 mg oral capsule: 1 cap(s) orally once a day      Fish Oil 1000 mg oral capsule: 2 cap(s) orally 2 times a day    NOTE: unable to verify with secondary source.  hydroxychloroquine 200 mg oral tablet: 1 tab(s) orally 2 times a day, 2 doses pending to complete 5 day course  metFORMIN 500 mg oral tablet: 1 tab(s) orally 2 times a day (with meals)      rosuvastatin 5 mg oral tablet: 1 tab(s) orally once a day

## 2020-04-05 NOTE — DISCHARGE NOTE PROVIDER - NSDCCPCAREPLAN_GEN_ALL_CORE_FT
PRINCIPAL DISCHARGE DIAGNOSIS  Diagnosis: Suspected 2019 novel coronavirus infection  Assessment and Plan of Treatment: You tested positive for COVID 19.  You no longer require hospitalization.  Please restrict activities outside of your home except for getting medical care.  Do not go to work, school, or public areas.  Avoid using public transportation, ride-sharing, or taxis.  Separate yourself from other people and animals in your home.  Call ahead before visiting your doctor.  Wear a facemask when you are around other people. Cover your cough and sneezes.  Clean your hands often.  Avoid sharing personal household items.  Clean all frequently touched surfaces daily.        SECONDARY DISCHARGE DIAGNOSES  Diagnosis: Hypertension  Assessment and Plan of Treatment: Low salt diet  Activity as tolerated.  Take all medication as prescribed.  Follow up with your medical doctor for routine blood pressure monitoring at your next visit.  Notify your doctor if you have any of the following symptoms:   Dizziness, Lightheadedness, Blurry vision, Headache, Chest pain, Shortness of breath      Diagnosis: Diabetes  Assessment and Plan of Treatment: Make sure you get your HgA1c checked every three months.  If you take oral diabetes medications, check your blood glucose two times a day.  If you take insulin, check your blood glucose before meals and at bedtime.  It's important not to skip any meals.  Keep a log of your blood glucose results and always take it with you to your doctor appointments.  Keep a list of your current medications including injectables and over the counter medications and bring this medication list with you to all your doctor appointments.  If you have not seen your opthalmologist this year call for appointment.  Check your feet daily for redness, sores, or openings. Do not self treat. If no improvement in two days call your primary care physician for an appointment.  Low blood sugar (hypoglycemia) is a blood sugar below 70mg/dl. Check your blood sugar if you feel signs/symptoms of hypoglycemia. If your blood sugar is below 70 take 15 grams of carbohydrates (ex 4 oz of apple juice, 3-4 glucosr tablets, or 4-6 oz of regular soda) wait 15 minutes and repeat blood sugar to make sure it comes up above 70.  If your blood sugar is above 70 and you are due for a meal, have a meal.  If you are not due for a meal have a snack.  This snack helps keeps your blood sugar at a safe range.

## 2020-11-04 NOTE — PHYSICAL THERAPY INITIAL EVALUATION ADULT - SIT-TO-STAND BALANCE
Action Requested: Summary for Provider     []  Critical Lab, Recommendations Needed  [] Need Additional Advice  []   FYI    []   Need Orders  [] Need Medications Sent to Pharmacy  []  Other     SUMMARY: Per protocol advised : OV today , has no transportati
fair balance/to rolling walker

## 2021-04-03 NOTE — PROGRESS NOTE ADULT - SUBJECTIVE AND OBJECTIVE BOX
65 yo F with HTN, HLD, DM2 presents with fever and dyspnea. 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 About 4-5 days ago, she started to feel short of breath. She has had no cough. Two days ago she also developed nausea and diarrhea. She has had a low appetite because of the nausea. She denies CP, palp, dizziness, syncope. Patient works as a nurse. states symptoms starte 6days ago and have gotten progressively worse. seen now on O2.     MEDICATIONS  (STANDING):  anakinra Injectable 100 milliGRAM(s) SubCutaneous <User Schedule>  atorvastatin 20 milliGRAM(s) Oral at bedtime  dextrose 5%. 1000 milliLiter(s) (50 mL/Hr) IV Continuous <Continuous>  dextrose 50% Injectable 12.5 Gram(s) IV Push once  enoxaparin Injectable 40 milliGRAM(s) SubCutaneous daily  hydroxychloroquine 200 milliGRAM(s) Oral every 12 hours  hydroxychloroquine   Oral   insulin lispro (HumaLOG) corrective regimen sliding scale   SubCutaneous three times a day before meals    MEDICATIONS  (PRN):  acetaminophen   Tablet .. 650 milliGRAM(s) Oral every 4 hours PRN Temp greater or equal to 38.5C (101.3F)  dextrose 40% Gel 15 Gram(s) Oral once PRN Blood Glucose LESS THAN 70 milliGRAM(s)/deciliter  glucagon  Injectable 1 milliGRAM(s) IntraMuscular once PRN Glucose LESS THAN 70 milligrams/deciliter  ondansetron Injectable 4 milliGRAM(s) IV Push every 6 hours PRN Nausea and/or Vomiting      VITALS:   T(C): 37.3 (04-02-20 @ 14:23), Max: 37.4 (04-02-20 @ 05:45)  HR: 83 (04-02-20 @ 15:45) (83 - 109)  BP: 138/83 (04-02-20 @ 14:23) (124/74 - 138/83)  RR: 20 (04-02-20 @ 15:45) (20 - 23)  SpO2: 95% (04-02-20 @ 15:45) (92% - 95%)  Wt(kg): --    PHYSICAL EXAM:  GENERAL: NAD, well nourished and conversant  HEAD:  Atraumatic  EYES: EOM, PERRLA, conjunctiva pink and sclera white  ENT: No tonsillar erythema, exudates, or enlargement, moist mucous membranes, good dentition, no lesions  NECK: Supple, No JVD, normal thyroid, carotids with normal upstrokes and no bruits  CHEST/LUNG: soft rhonchi with decreased breath ssounds  HEART: Regular rate and rhythm, No murmurs, rubs, or gallops  ABDOMEN: Soft, nondistended, no masses, guarding, tenderness or rebound, bowel sounds present  EXTREMITIES:  2+ Peripheral Pulses, No clubbing, cyanosis, or edema.   LYMPH: No lymphadenopathy noted  SKIN: No rashes or lesions  NERVOUS SYSTEM:  Alert & Oriented X3, normal cognitive function. Motor Strength 5/5 right upper and right lower.  5/5 left upper and left lower extremities, DTRs 2+ intact and symmetric      LABS:    CARDIAC MARKERS ( 01 Apr 2020 19:44 )  x     / x     / 366 U/L / x     / x          CBC Full  -  ( 02 Apr 2020 06:10 )  WBC Count : 7.66 K/uL  RBC Count : 4.32 M/uL  Hemoglobin : 12.1 g/dL  Hematocrit : 38.8 %  Platelet Count - Automated : 265 K/uL  Mean Cell Volume : 89.8 fl  Mean Cell Hemoglobin : 28.0 pg  Mean Cell Hemoglobin Concentration : 31.2 gm/dL  Auto Neutrophil # : 5.58 K/uL  Auto Lymphocyte # : 1.54 K/uL  Auto Monocyte # : 0.32 K/uL  Auto Eosinophil # : 0.06 K/uL  Auto Basophil # : 0.04 K/uL  Auto Neutrophil % : 72.8 %  Auto Lymphocyte % : 20.1 %  Auto Monocyte % : 4.2 %  Auto Eosinophil % : 0.8 %  Auto Basophil % : 0.5 %    04-02    140  |  103  |  17  ----------------------------<  108<H>  3.9   |  24  |  1.06    Ca    9.4      02 Apr 2020 06:10    TPro  7.8  /  Alb  3.1<L>  /  TBili  0.4  /  DBili  x   /  AST  29  /  ALT  22  /  AlkPhos  43  04-02    LIVER FUNCTIONS - ( 02 Apr 2020 06:10 )  Alb: 3.1 g/dL / Pro: 7.8 g/dL / ALK PHOS: 43 U/L / ALT: 22 U/L / AST: 29 U/L / GGT: x           PT/INR - ( 01 Apr 2020 19:44 )   PT: 15.2 sec;   INR: 1.31 ratio         PTT - ( 01 Apr 2020 19:44 )  PTT:35.3 sec    CAPILLARY BLOOD GLUCOSE      POCT Blood Glucose.: 110 mg/dL (02 Apr 2020 17:32)  POCT Blood Glucose.: 123 mg/dL (02 Apr 2020 13:14)  POCT Blood Glucose.: 90 mg/dL (02 Apr 2020 09:10)      RADIOLOGY & ADDITIONAL TESTS: 1 person assist

## 2021-04-26 PROBLEM — E11.9 TYPE 2 DIABETES MELLITUS WITHOUT COMPLICATIONS: Chronic | Status: ACTIVE | Noted: 2020-04-01

## 2021-04-26 PROBLEM — I10 ESSENTIAL (PRIMARY) HYPERTENSION: Chronic | Status: ACTIVE | Noted: 2020-04-01

## 2021-04-26 PROBLEM — E78.5 HYPERLIPIDEMIA, UNSPECIFIED: Chronic | Status: ACTIVE | Noted: 2020-04-01

## 2021-05-12 ENCOUNTER — NON-APPOINTMENT (OUTPATIENT)
Age: 66
End: 2021-05-12

## 2021-05-12 ENCOUNTER — APPOINTMENT (OUTPATIENT)
Dept: INTERNAL MEDICINE | Facility: CLINIC | Age: 66
End: 2021-05-12
Payer: COMMERCIAL

## 2021-05-12 ENCOUNTER — TRANSCRIPTION ENCOUNTER (OUTPATIENT)
Age: 66
End: 2021-05-12

## 2021-05-12 VITALS — RESPIRATION RATE: 16 BRPM | SYSTOLIC BLOOD PRESSURE: 144 MMHG | DIASTOLIC BLOOD PRESSURE: 88 MMHG

## 2021-05-12 VITALS
HEART RATE: 92 BPM | DIASTOLIC BLOOD PRESSURE: 77 MMHG | OXYGEN SATURATION: 98 % | SYSTOLIC BLOOD PRESSURE: 136 MMHG | HEIGHT: 66 IN | TEMPERATURE: 97.8 F | WEIGHT: 174 LBS | BODY MASS INDEX: 27.97 KG/M2

## 2021-05-12 DIAGNOSIS — E11.9 TYPE 2 DIABETES MELLITUS W/OUT COMPLICATIONS: ICD-10-CM

## 2021-05-12 DIAGNOSIS — E66.3 OVERWEIGHT: ICD-10-CM

## 2021-05-12 DIAGNOSIS — Z12.11 ENCOUNTER FOR SCREENING FOR MALIGNANT NEOPLASM OF COLON: ICD-10-CM

## 2021-05-12 DIAGNOSIS — N64.4 MASTODYNIA: ICD-10-CM

## 2021-05-12 DIAGNOSIS — M25.561 PAIN IN RIGHT KNEE: ICD-10-CM

## 2021-05-12 DIAGNOSIS — G89.29 PAIN IN RIGHT KNEE: ICD-10-CM

## 2021-05-12 DIAGNOSIS — Z00.00 ENCOUNTER FOR GENERAL ADULT MEDICAL EXAMINATION W/OUT ABNORMAL FINDINGS: ICD-10-CM

## 2021-05-12 PROCEDURE — 99213 OFFICE O/P EST LOW 20 MIN: CPT | Mod: 25

## 2021-05-12 PROCEDURE — 99072 ADDL SUPL MATRL&STAF TM PHE: CPT

## 2021-05-12 PROCEDURE — 36415 COLL VENOUS BLD VENIPUNCTURE: CPT

## 2021-05-12 PROCEDURE — 99386 PREV VISIT NEW AGE 40-64: CPT | Mod: 25

## 2021-05-12 RX ORDER — OMEGA-3/DHA/EPA/FISH OIL 300-1000MG
1000 CAPSULE ORAL
Qty: 120 | Refills: 0 | Status: COMPLETED | COMMUNITY
Start: 2019-12-19 | End: 2021-05-11

## 2021-05-12 RX ORDER — CYCLOBENZAPRINE HYDROCHLORIDE 10 MG/1
10 TABLET, FILM COATED ORAL
Qty: 30 | Refills: 0 | Status: COMPLETED | COMMUNITY
Start: 2019-12-19 | End: 2021-05-11

## 2021-05-12 RX ORDER — LIDOCAINE AND PRILOCAINE 25; 25 MG/G; MG/G
2.5-2.5 CREAM TOPICAL
Qty: 30 | Refills: 0 | Status: COMPLETED | COMMUNITY
Start: 2019-12-19 | End: 2021-05-11

## 2021-05-12 RX ORDER — METFORMIN HYDROCHLORIDE 500 MG/1
500 TABLET, COATED ORAL
Qty: 270 | Refills: 0 | Status: DISCONTINUED | COMMUNITY
Start: 2019-04-17 | End: 2021-05-11

## 2021-05-12 NOTE — PLAN
[FreeTextEntry1] : - Low sodium diet\par - Ambulatory BP monitoring\par - Short interval follow up with home BP machine for validation\par - Patient voiced understanding of above. \par - Continue with all other treatments\par  - Labs done in office\par - Further management pending lab results \par - Immunizations on next follow up \par - Patient is in agreement with daily statin therapy\par - Decrease metformin to once daily dosing in view of recent HBA1c of 6.4\par - Cardiology follow up for chest pain\par - Breast surgery follow up

## 2021-05-12 NOTE — HISTORY OF PRESENT ILLNESS
[FreeTextEntry1] : Patient presents for new patient evaluation and CPE.  [de-identified] : Patient presents for new patient evaluation and CPE. She reports having intermittent left sided chest pain. Symptoms occurs on and off. Most recent HBA1c was 6.4. Patient had previous work up with cardiology and no evidence of ischemic heart disease was noted. Presently, she reports intermittent left breast pain. She also reports having several biopsy to the b/l breast with benign pathological findings. No other complaints at present. \par

## 2021-05-12 NOTE — HEALTH RISK ASSESSMENT
[Fair] : ~his/her~ current health as fair  [Good] : ~his/her~  mood as  good [Intercurrent ED visits] : went to ED [Yes] : Yes [Monthly or less (1 pt)] : Monthly or less (1 point) [1 or 2 (0 pts)] : 1 or 2 (0 points) [Never (0 pts)] : Never (0 points) [No] : In the past 12 months have you used drugs other than those required for medical reasons? No [No falls in past year] : Patient reported no falls in the past year [0] : 1) Little interest or pleasure doing things: Not at all (0) [No Retinopathy] : No retinopathy [HIV Test offered] : HIV Test offered [Hepatitis C test offered] : Hepatitis C test offered [None] : None [With Family] : lives with family [# of Members in Household ___] :  household currently consist of [unfilled] member(s) [Employed] : employed [College] : College [# Of Children ___] : has [unfilled] children [Single] : single [Sexually Active] : sexually active [Feels Safe at Home] : Feels safe at home [Fully functional (bathing, dressing, toileting, transferring, walking, feeding)] : Fully functional (bathing, dressing, toileting, transferring, walking, feeding) [Fully functional (using the telephone, shopping, preparing meals, housekeeping, doing laundry, using] : Fully functional and needs no help or supervision to perform IADLs (using the telephone, shopping, preparing meals, housekeeping, doing laundry, using transportation, managing medications and managing finances) [Smoke Detector] : smoke detector [Carbon Monoxide Detector] : carbon monoxide detector [Seat Belt] :  uses seat belt [Sunscreen] : uses sunscreen [Name: ___] : Health Care Proxy's Name: [unfilled]  [With Patient/Caregiver] : With Patient/Caregiver [Relationship: ___] : Relationship: [unfilled] [Aggressive treatment] : aggressive treatment [I will adhere to the patient's wishes as expressed in the advance directive except as noted below.] : I will adhere to the patient's wishes as expressed in the advance directive except as noted below [FreeTextEntry1] : Establishing care [] : No [de-identified] : Cardiology [Audit-CScore] : 1 [de-identified] : Walking [de-identified] : Low sodium, low cholesterol, and carbohydrate restricted diet.  [EyeExamDate] : 10/20 [High Risk Behavior] : no high risk behavior [Reports changes in hearing] : Reports no changes in hearing [Reports changes in vision] : Reports no changes in vision [Reports changes in dental health] : Reports no changes in dental health [Guns at Home] : no guns at home [Travel to Developing Areas] : does not  travel to developing areas [MammogramDate] : 03/19 [PapSmearDate] : 03/20 [AdvancecareDate] : 05/21 [FreeTextEntry4] : Patient is not in agreement with prolong mechanical ventilation and life support measures.

## 2021-05-12 NOTE — PHYSICAL EXAM
[No Acute Distress] : no acute distress [Well Nourished] : well nourished [Well Developed] : well developed [Well-Appearing] : well-appearing [Normal Sclera/Conjunctiva] : normal sclera/conjunctiva [PERRL] : pupils equal round and reactive to light [EOMI] : extraocular movements intact [Normal Outer Ear/Nose] : the outer ears and nose were normal in appearance [Normal Oropharynx] : the oropharynx was normal [No JVD] : no jugular venous distention [No Lymphadenopathy] : no lymphadenopathy [Supple] : supple [Thyroid Normal, No Nodules] : the thyroid was normal and there were no nodules present [No Respiratory Distress] : no respiratory distress  [No Accessory Muscle Use] : no accessory muscle use [Clear to Auscultation] : lungs were clear to auscultation bilaterally [Normal Rate] : normal rate  [Regular Rhythm] : with a regular rhythm [Normal S1, S2] : normal S1 and S2 [No Murmur] : no murmur heard [No Carotid Bruits] : no carotid bruits [No Abdominal Bruit] : a ~M bruit was not heard ~T in the abdomen [No Varicosities] : no varicosities [Pedal Pulses Present] : the pedal pulses are present [No Edema] : there was no peripheral edema [No Palpable Aorta] : no palpable aorta [No Extremity Clubbing/Cyanosis] : no extremity clubbing/cyanosis [Soft] : abdomen soft [Non Tender] : non-tender [Non-distended] : non-distended [No Masses] : no abdominal mass palpated [No HSM] : no HSM [Normal Bowel Sounds] : normal bowel sounds [Normal Posterior Cervical Nodes] : no posterior cervical lymphadenopathy [Normal Anterior Cervical Nodes] : no anterior cervical lymphadenopathy [No CVA Tenderness] : no CVA  tenderness [No Spinal Tenderness] : no spinal tenderness [No Joint Swelling] : no joint swelling [Grossly Normal Strength/Tone] : grossly normal strength/tone [No Rash] : no rash [Coordination Grossly Intact] : coordination grossly intact [No Focal Deficits] : no focal deficits [Normal Gait] : normal gait [Normal Affect] : the affect was normal [Alert and Oriented x3] : oriented to person, place, and time [Normal Insight/Judgement] : insight and judgment were intact [Comprehensive Foot Exam Normal] : Right and left foot were examined and both feet are normal. No ulcers in either foot. Toes are normal and with full ROM.  Normal tactile sensation with monofilament testing throughout both feet [de-identified] : Patient will follow up with breast surgeon

## 2021-05-17 LAB
25(OH)D3 SERPL-MCNC: 34.8 NG/ML
ALBUMIN SERPL ELPH-MCNC: 4.6 G/DL
ALP BLD-CCNC: 68 U/L
ALT SERPL-CCNC: 21 U/L
ANION GAP SERPL CALC-SCNC: 14 MMOL/L
AST SERPL-CCNC: 19 U/L
BASOPHILS # BLD AUTO: 0.06 K/UL
BASOPHILS NFR BLD AUTO: 1 %
BILIRUB SERPL-MCNC: 0.2 MG/DL
BUN SERPL-MCNC: 14 MG/DL
CALCIUM SERPL-MCNC: 10 MG/DL
CHLORIDE SERPL-SCNC: 102 MMOL/L
CHOLEST SERPL-MCNC: 211 MG/DL
CK MB BLD-MCNC: 3.1 NG/ML
CO2 SERPL-SCNC: 23 MMOL/L
CREAT SERPL-MCNC: 0.95 MG/DL
EOSINOPHIL # BLD AUTO: 0.3 K/UL
EOSINOPHIL NFR BLD AUTO: 4.9 %
ESTIMATED AVERAGE GLUCOSE: 148 MG/DL
GLUCOSE SERPL-MCNC: 86 MG/DL
HBA1C MFR BLD HPLC: 6.8 %
HCT VFR BLD CALC: 42.2 %
HCV AB SER QL: NONREACTIVE
HCV S/CO RATIO: 0.1 S/CO
HDLC SERPL-MCNC: 61 MG/DL
HGB BLD-MCNC: 13.4 G/DL
HIV1+2 AB SPEC QL IA.RAPID: NONREACTIVE
IMM GRANULOCYTES NFR BLD AUTO: 0.3 %
LDLC SERPL CALC-MCNC: 107 MG/DL
LYMPHOCYTES # BLD AUTO: 3.09 K/UL
LYMPHOCYTES NFR BLD AUTO: 50.5 %
MAN DIFF?: NORMAL
MCHC RBC-ENTMCNC: 28.3 PG
MCHC RBC-ENTMCNC: 31.8 GM/DL
MCV RBC AUTO: 89.2 FL
MONOCYTES # BLD AUTO: 0.47 K/UL
MONOCYTES NFR BLD AUTO: 7.7 %
NEUTROPHILS # BLD AUTO: 2.18 K/UL
NEUTROPHILS NFR BLD AUTO: 35.6 %
NONHDLC SERPL-MCNC: 150 MG/DL
PLATELET # BLD AUTO: 324 K/UL
POTASSIUM SERPL-SCNC: 3.9 MMOL/L
PROT SERPL-MCNC: 7.9 G/DL
RBC # BLD: 4.73 M/UL
RBC # FLD: 13.3 %
SODIUM SERPL-SCNC: 139 MMOL/L
TRIGL SERPL-MCNC: 213 MG/DL
TROPONIN I SERPL-MCNC: 0.01 NG/ML
TSH SERPL-ACNC: 1.61 UIU/ML
WBC # FLD AUTO: 6.12 K/UL

## 2021-08-25 RX ORDER — SULFAMETHOXAZOLE AND TRIMETHOPRIM 800; 160 MG/1; MG/1
800-160 TABLET ORAL
Qty: 6 | Refills: 0 | Status: COMPLETED | COMMUNITY
Start: 2021-08-25 | End: 2021-08-28

## 2021-10-06 ENCOUNTER — APPOINTMENT (OUTPATIENT)
Dept: INTERNAL MEDICINE | Facility: CLINIC | Age: 66
End: 2021-10-06

## 2021-11-22 ENCOUNTER — NON-APPOINTMENT (OUTPATIENT)
Age: 66
End: 2021-11-22

## 2021-12-17 NOTE — ED PROVIDER NOTE - ATTENDING CONTRIBUTION TO CARE
rv in 6 months with labs prior
Attending MD Hua:  I personally have seen and examined this patient.  Resident note reviewed and agree on plan of care and except where noted.  See HPI, PE, and MDM for details.

## 2022-03-13 ENCOUNTER — TRANSCRIPTION ENCOUNTER (OUTPATIENT)
Age: 67
End: 2022-03-13

## 2022-05-02 ENCOUNTER — APPOINTMENT (OUTPATIENT)
Dept: UROLOGY | Facility: CLINIC | Age: 67
End: 2022-05-02

## 2022-05-05 ENCOUNTER — APPOINTMENT (OUTPATIENT)
Dept: UROLOGY | Facility: CLINIC | Age: 67
End: 2022-05-05
Payer: COMMERCIAL

## 2022-05-05 VITALS
OXYGEN SATURATION: 100 % | WEIGHT: 175 LBS | HEIGHT: 66 IN | BODY MASS INDEX: 28.12 KG/M2 | HEART RATE: 90 BPM | DIASTOLIC BLOOD PRESSURE: 79 MMHG | SYSTOLIC BLOOD PRESSURE: 122 MMHG

## 2022-05-05 DIAGNOSIS — R39.9 UNSPECIFIED SYMPTOMS AND SIGNS INVOLVING THE GENITOURINARY SYSTEM: ICD-10-CM

## 2022-05-05 DIAGNOSIS — R31.29 OTHER MICROSCOPIC HEMATURIA: ICD-10-CM

## 2022-05-05 PROCEDURE — 99203 OFFICE O/P NEW LOW 30 MIN: CPT | Mod: 25

## 2022-05-05 PROCEDURE — 51798 US URINE CAPACITY MEASURE: CPT

## 2022-05-05 NOTE — REVIEW OF SYSTEMS
[Dry Eyes] : dryness of the eyes [Blood In The Urine] : hematuria [Urinary Incontinence] : urinary incontinence [Negative] : Heme/Lymph [Constipation] : no constipation [FreeTextEntry3] : Bladder pressure

## 2022-05-05 NOTE — LETTER BODY
[Dear  ___] : Dear  [unfilled], [Consult Letter:] : I had the pleasure of evaluating your patient, [unfilled]. [Please see my note below.] : Please see my note below. [Consult Closing:] : Thank you very much for allowing me to participate in the care of this patient.  If you have any questions, please do not hesitate to contact me. [FreeTextEntry1] : Please see my note. I will keep you informed. [FreeTextEntry3] : Sincerely,\par \par Tigist Story MD\par Clinical \par [facility]\par [facility]\par

## 2022-05-05 NOTE — HISTORY OF PRESENT ILLNESS
[FreeTextEntry1] : ARIANA JONES is a 66 year old F who presents with microscopic hematuria.  She is a never smoker and has never seen gross blood. There is also no h/o stones, and rare UTI. Last one was 3 yrs ago. There is no h/o febrile or complicated UTI's and no FH of  anomalies,  malignancies.\par \par Pt c/o DAQUAN and needs one mini liner per day.  There are no complaints of urgency or frequency: she holds her urine for over  5 hrs at a time due to work with no issue but was reminded that may not be great when diabetic.\par Significant c/o nocturia\par \par Never smoker\par \par M passed at 88 yo\par D passed at 89 yo\par \par Her daughter with focal segmental glomerular nephritis diagnosed in her early teens.\par

## 2022-05-05 NOTE — ASSESSMENT
[FreeTextEntry1] : UA and culture sent.\par \par PVR done and was 0 despite her holding her urine for long periods of time.\par \par We had a lengthy discussion regarding the definition of microscopic hematuria and the significance of gross hematuria. The patient understands that the kidneys filter large volumes of blood per minute and can often permit passage of a few red blood cells  through the "filter". Though many times we may not find any significant or worrisome diagnosis, a workup is warranted. This includes microscopic study of the urine, culture, occasionally cystoscopy and upper tract imaging.\par \par 3/17/22 Abd US revealed normal kidneys b/l w b/l cysts

## 2022-05-08 LAB
APPEARANCE: CLEAR
BACTERIA UR CULT: NORMAL
BACTERIA: NEGATIVE
BILIRUBIN URINE: NEGATIVE
BLOOD URINE: NEGATIVE
COLOR: YELLOW
GLUCOSE QUALITATIVE U: NEGATIVE
HYALINE CASTS: 0 /LPF
KETONES URINE: NORMAL
LEUKOCYTE ESTERASE URINE: NEGATIVE
MICROSCOPIC-UA: NORMAL
NITRITE URINE: NEGATIVE
PH URINE: 6
PROTEIN URINE: ABNORMAL
RED BLOOD CELLS URINE: 3 /HPF
SPECIFIC GRAVITY URINE: 1.02
SQUAMOUS EPITHELIAL CELLS: 5 /HPF
UROBILINOGEN URINE: NORMAL
WHITE BLOOD CELLS URINE: 2 /HPF

## 2022-05-21 ENCOUNTER — NON-APPOINTMENT (OUTPATIENT)
Age: 67
End: 2022-05-21

## 2022-09-14 ENCOUNTER — APPOINTMENT (OUTPATIENT)
Dept: ORTHOPEDIC SURGERY | Facility: CLINIC | Age: 67
End: 2022-09-14

## 2022-09-14 VITALS — WEIGHT: 175 LBS | HEIGHT: 66 IN | BODY MASS INDEX: 28.12 KG/M2

## 2022-09-14 DIAGNOSIS — M25.551 PAIN IN RIGHT HIP: ICD-10-CM

## 2022-09-14 PROCEDURE — 73502 X-RAY EXAM HIP UNI 2-3 VIEWS: CPT | Mod: RT

## 2022-09-14 PROCEDURE — 99204 OFFICE O/P NEW MOD 45 MIN: CPT

## 2022-09-14 NOTE — HISTORY OF PRESENT ILLNESS
[Gradual] : gradual [7] : 7 [0] : 0 [Dull/Aching] : dull/aching [Constant] : constant [Household chores] : household chores [Meds] : meds [de-identified] : 68yo F with right hip pain for the past few days with no injury. She had done acupuncture that had been very beneficial. She has minimal pain today. She states her sister had right hip pain a few years ago that was later found out to be lung cancer with mets to the hip.  [] : no [FreeTextEntry1] : right hip [FreeTextEntry3] : 1 week ago  [FreeTextEntry5] : started to have pain in the hip. no injury/trauma - did acupuncture [FreeTextEntry6] : deep pain

## 2022-09-14 NOTE — ASSESSMENT
[FreeTextEntry1] : 67F p/w R hip pain\par \par f/u MRI hip, r/o labral tear\par return after imaging

## 2022-09-14 NOTE — PHYSICAL EXAM
[Follow Up] : follow up GYN visit [NL (120)] : flexion 120 degrees [NL (45)] : internal rotation 45 degrees [2+] : dorsalis pedis pulse: 2+ [] : light touch intact throughout [Right] : right hip with pelvis [All Views] : anteroposterior, lateral [There are no fractures, subluxations or dislocations. No significant abnormalities are seen] : There are no fractures, subluxations or dislocations. No significant abnormalities are seen

## 2022-09-17 ENCOUNTER — APPOINTMENT (OUTPATIENT)
Dept: MRI IMAGING | Facility: CLINIC | Age: 67
End: 2022-09-17

## 2022-09-21 ENCOUNTER — FORM ENCOUNTER (OUTPATIENT)
Age: 67
End: 2022-09-21

## 2022-09-22 ENCOUNTER — APPOINTMENT (OUTPATIENT)
Dept: MRI IMAGING | Facility: CLINIC | Age: 67
End: 2022-09-22

## 2022-09-22 PROCEDURE — 73721 MRI JNT OF LWR EXTRE W/O DYE: CPT

## 2022-09-30 ENCOUNTER — APPOINTMENT (OUTPATIENT)
Dept: ORTHOPEDIC SURGERY | Facility: CLINIC | Age: 67
End: 2022-09-30

## 2022-09-30 VITALS — WEIGHT: 175 LBS | HEIGHT: 66 IN | BODY MASS INDEX: 28.12 KG/M2

## 2022-09-30 DIAGNOSIS — S70.01XA CONTUSION OF RIGHT HIP, INITIAL ENCOUNTER: ICD-10-CM

## 2022-09-30 PROCEDURE — 99214 OFFICE O/P EST MOD 30 MIN: CPT

## 2022-09-30 NOTE — ASSESSMENT
[FreeTextEntry1] : 67F p/w R hip contusion, possible old fracture\par \par Begin PT and meloxicam\par return 6 weeks if not improving\par \par The patient was advised of the diagnosis. The natural history of the pathology was explained in full to the patient in layman's terms. All questions were answered. The risks and benefits of surgical and non-surgical treatment alternatives were explained in full to the patient. \par

## 2022-09-30 NOTE — DATA REVIEWED
[MRI] : MRI [Right] : of the right [Hip] : hip [Report was reviewed and noted in the chart] : The report was reviewed and noted in the chart [I reviewed the films/CD and agree] : I reviewed the films/CD and agree [FreeTextEntry1] : Impression: \par 1. Nonspecific marrow edema in the right greater trochanter measuring 3 cm could represent a nondisplaced avulsion \par fracture with strain of the distal right gluteal tendon insertion posteriorly and mild surrounding bursitis and soft tissue \par swelling without displaced bone fragment or tendon discontinuity. Clinical correlation and follow up is recommended.\par 2. Undersurface tearing of the anterior and superior labrum with slight effusion and synovitis in the anterior superior right\par hip joint.\par 3. Extremely large leiomyomatous uterus measuring 14-15 cm in craniocaudad and transverse dimensions with mild free \par fluid in the pelvis suspected anteriorly on the right, not adequately evaluated on the current exam. Recommend CT scan of \par the abdomen and pelvis with intravenous contrast material to further evaluate.\par 4. Degenerative changes in the lower lumbar spine not adequately evaluated on the current exam.\par 5. Bilateral hamstring insertional tendinopathy.\par 6. Slight right ischiofemoral bursitis.\par 7. Consider CT scan of the right hip to further evaluate if the patient's pain persists despite conservative therapy or as \par clinically indicated.

## 2022-09-30 NOTE — PHYSICAL EXAM
[NL (120)] : flexion 120 degrees [NL (45)] : internal rotation 45 degrees [2+] : dorsalis pedis pulse: 2+ [Right] : right hip with pelvis [All Views] : anteroposterior, lateral [There are no fractures, subluxations or dislocations. No significant abnormalities are seen] : There are no fractures, subluxations or dislocations. No significant abnormalities are seen [] : no erythema

## 2022-09-30 NOTE — HISTORY OF PRESENT ILLNESS
[2] : 2 [0] : 0 [Dull/Aching] : dull/aching [de-identified] : 68yo F with right hip pain after a fall in June in [place]. She had done acupuncture that had been very beneficial. She has minimal pain today. She states her sister had right hip pain a few years ago that was later found out to be lung cancer with mets to the hip.  [] : Post Surgical Visit: no [FreeTextEntry1] : right hip  [de-identified] : none

## 2023-01-30 ENCOUNTER — APPOINTMENT (OUTPATIENT)
Dept: GYNECOLOGIC ONCOLOGY | Facility: CLINIC | Age: 68
End: 2023-01-30
Payer: COMMERCIAL

## 2023-01-30 VITALS
HEIGHT: 66 IN | SYSTOLIC BLOOD PRESSURE: 154 MMHG | WEIGHT: 164 LBS | HEART RATE: 78 BPM | BODY MASS INDEX: 26.36 KG/M2 | DIASTOLIC BLOOD PRESSURE: 92 MMHG

## 2023-01-30 DIAGNOSIS — I10 ESSENTIAL (PRIMARY) HYPERTENSION: ICD-10-CM

## 2023-01-30 DIAGNOSIS — E78.5 HYPERLIPIDEMIA, UNSPECIFIED: ICD-10-CM

## 2023-01-30 PROCEDURE — 99205 OFFICE O/P NEW HI 60 MIN: CPT

## 2023-01-30 RX ORDER — ROSUVASTATIN CALCIUM 20 MG/1
20 TABLET, FILM COATED ORAL
Qty: 90 | Refills: 0 | Status: DISCONTINUED | COMMUNITY
Start: 2017-09-19 | End: 2023-01-30

## 2023-01-30 RX ORDER — DICLOFENAC SODIUM 3 G/100G
3 GEL TOPICAL
Qty: 100 | Refills: 0 | Status: DISCONTINUED | COMMUNITY
Start: 2019-12-19 | End: 2023-01-30

## 2023-01-30 RX ORDER — MELOXICAM 15 MG/1
15 TABLET ORAL
Qty: 30 | Refills: 1 | Status: DISCONTINUED | COMMUNITY
Start: 2022-09-30 | End: 2023-01-30

## 2023-01-30 RX ORDER — UBIDECARENONE/VIT E ACET 100MG-5
1000 CAPSULE ORAL
Refills: 0 | Status: DISCONTINUED | COMMUNITY
End: 2023-01-30

## 2023-01-30 RX ORDER — AMLODIPINE BESYLATE AND BENAZEPRIL HYDROCHLORIDE 10; 20 MG/1; MG/1
10-20 CAPSULE ORAL
Qty: 90 | Refills: 1 | Status: DISCONTINUED | COMMUNITY
End: 2023-01-30

## 2023-02-01 ENCOUNTER — RESULT REVIEW (OUTPATIENT)
Age: 68
End: 2023-02-01

## 2023-02-15 NOTE — PROGRESS NOTE ADULT - ATTENDING COMMENTS
I am a non participating [employer] physician seeing Pt in coverage for Dr Nolan I am a non participating [facility] physician seeing Pt in coverage for Dr Nolan. The above patient examination was reviewed with Dr. Phillips and I agree with his evaluation, assessment and treatment plan.  Juan J Nolan M.D. As certified below, I, or a nurse practitioner or physician assistant working with me, had a face-to-face encounter that meets the physician face-to-face encounter requirements.

## 2023-02-21 ENCOUNTER — APPOINTMENT (OUTPATIENT)
Dept: UROGYNECOLOGY | Facility: CLINIC | Age: 68
End: 2023-02-21
Payer: COMMERCIAL

## 2023-02-21 VITALS
BODY MASS INDEX: 26.84 KG/M2 | HEART RATE: 77 BPM | HEIGHT: 66 IN | SYSTOLIC BLOOD PRESSURE: 124 MMHG | WEIGHT: 167 LBS | DIASTOLIC BLOOD PRESSURE: 77 MMHG

## 2023-02-21 DIAGNOSIS — R35.1 NOCTURIA: ICD-10-CM

## 2023-02-21 DIAGNOSIS — R39.15 URGENCY OF URINATION: ICD-10-CM

## 2023-02-21 LAB
BILIRUB UR QL STRIP: NORMAL
CLARITY UR: CLEAR
COLLECTION METHOD: NORMAL
GLUCOSE UR-MCNC: NORMAL
HCG UR QL: 0.2 EU/DL
HGB UR QL STRIP.AUTO: NORMAL
KETONES UR-MCNC: NORMAL
LEUKOCYTE ESTERASE UR QL STRIP: NORMAL
NITRITE UR QL STRIP: NORMAL
PH UR STRIP: 5
PROT UR STRIP-MCNC: NORMAL
SP GR UR STRIP: 1.03

## 2023-02-21 PROCEDURE — 99204 OFFICE O/P NEW MOD 45 MIN: CPT | Mod: 25

## 2023-02-21 PROCEDURE — 51701 INSERT BLADDER CATHETER: CPT

## 2023-02-21 NOTE — DISCUSSION/SUMMARY
[FreeTextEntry1] : \par Yesenia presents with a large fibroid uterus with large anterior myoma likely causing mass effect on bladder and contributing to her urinary symptoms. We discussed her urinary symptoms. She has mild DAQUAN. We discussed her options.  I recommend proceeding with surgery with Dr Ly and re-evaluating urinary symptoms once fibroids removed. If urinary symptoms persist, we discussed treatment options including both nonsurgical and surgical options. We also discussed office procedures including noemi-urethral bulking. She will RTO in 3-4 mo for follow up. All questions answered.

## 2023-02-21 NOTE — PHYSICAL EXAM
[Chaperone Present] : A chaperone was present in the examining room during all aspects of the physical examination [Labia Majora] : were normal [Labia Minora] : were normal [Normal Appearance] : general appearance was normal [Normal] : no abnormalities [FreeTextEntry1] : General: Well, appearing. Alert and orientated. No acute distress\par HEENT: Normocephalic, atraumatic and extraocular muscles appear to be intact \par Neck: Full range of motion, no obvious lymphadenopathy, deformities, or masses noted \par Respiratory: Speaking in full sentences comfortably, normal work of breathing and no cough during visit\par Musculoskeletal: active full range of motion in extremities \par Extremities: No upper extremity edema noted\par Skin: no obvious rash or skin lesions\par Neuro: Orientated X 3, speech is fluent, normal rate\par Psych: Normal mood and affect  [Tenderness] : ~T no ~M abdominal tenderness observed [Exam Deferred] : was deferred [de-identified] : no prolapse [de-identified] : enlarged fibroid uterus, fibroid palpated in location of bladder [de-identified] : fibroid palpated in location of bladder

## 2023-02-21 NOTE — HISTORY OF PRESENT ILLNESS
[Unable To Restrain Bowel Movement] : no [Feelings Of Urinary Urgency] : no [x3+] : nocturia three or more  times a night [] : yes [Uses ___ pads per day] : uses [unfilled] pad(s) per day [de-identified] : reports this is mild and occasional [FreeTextEntry1] : Yesenia is a 68yo who presents for consultation with a h/o urinary incontinence. She has a large fibroid uterus and is scheduling surgery with Dr Ly. Her imaging was reviewed. She has multiple fibroids, largest is 9.6x9.4cm in anterior lower uterine segment. \par \par \par PMH: HTN, HLD, DM, glaucoma\par PSH: denies \par Social History: , nonsmoker, employed as visiting nurse

## 2023-02-23 ENCOUNTER — NON-APPOINTMENT (OUTPATIENT)
Age: 68
End: 2023-02-23

## 2023-02-23 LAB
APPEARANCE: CLEAR
BACTERIA UR CULT: NORMAL
BACTERIA: NEGATIVE
BILIRUBIN URINE: NEGATIVE
BLOOD URINE: NEGATIVE
COLOR: YELLOW
GLUCOSE QUALITATIVE U: NEGATIVE
HYALINE CASTS: 0 /LPF
KETONES URINE: NEGATIVE
LEUKOCYTE ESTERASE URINE: NEGATIVE
MICROSCOPIC-UA: NORMAL
NITRITE URINE: NEGATIVE
PH URINE: 6
PROTEIN URINE: ABNORMAL
RED BLOOD CELLS URINE: 1 /HPF
SPECIFIC GRAVITY URINE: >=1.03
SQUAMOUS EPITHELIAL CELLS: 1 /HPF
URIC ACID CRYSTALS: ABNORMAL
URINE COMMENTS: NORMAL
UROBILINOGEN URINE: NORMAL
WHITE BLOOD CELLS URINE: 0 /HPF

## 2023-02-24 DIAGNOSIS — Z83.511 FAMILY HISTORY OF GLAUCOMA: ICD-10-CM

## 2023-02-24 DIAGNOSIS — Z63.4 DISAPPEARANCE AND DEATH OF FAMILY MEMBER: ICD-10-CM

## 2023-02-24 DIAGNOSIS — Z86.39 PERSONAL HISTORY OF OTHER ENDOCRINE, NUTRITIONAL AND METABOLIC DISEASE: ICD-10-CM

## 2023-02-24 DIAGNOSIS — Z82.49 FAMILY HISTORY OF ISCHEMIC HEART DISEASE AND OTHER DISEASES OF THE CIRCULATORY SYSTEM: ICD-10-CM

## 2023-02-24 DIAGNOSIS — Z83.49 FAMILY HISTORY OF OTHER ENDOCRINE, NUTRITIONAL AND METABOLIC DISEASES: ICD-10-CM

## 2023-02-24 DIAGNOSIS — Z82.3 FAMILY HISTORY OF STROKE: ICD-10-CM

## 2023-02-24 DIAGNOSIS — Z86.69 PERSONAL HISTORY OF OTHER DISEASES OF THE NERVOUS SYSTEM AND SENSE ORGANS: ICD-10-CM

## 2023-02-24 SDOH — SOCIAL STABILITY - SOCIAL INSECURITY: DISSAPEARANCE AND DEATH OF FAMILY MEMBER: Z63.4

## 2023-02-25 ENCOUNTER — APPOINTMENT (OUTPATIENT)
Dept: CT IMAGING | Facility: IMAGING CENTER | Age: 68
End: 2023-02-25
Payer: COMMERCIAL

## 2023-02-25 ENCOUNTER — OUTPATIENT (OUTPATIENT)
Dept: OUTPATIENT SERVICES | Facility: HOSPITAL | Age: 68
LOS: 1 days | End: 2023-02-25
Payer: COMMERCIAL

## 2023-02-25 DIAGNOSIS — R14.0 ABDOMINAL DISTENSION (GASEOUS): ICD-10-CM

## 2023-02-25 PROCEDURE — 74160 CT ABDOMEN W/CONTRAST: CPT | Mod: 26

## 2023-02-25 PROCEDURE — 74160 CT ABDOMEN W/CONTRAST: CPT

## 2023-07-25 ENCOUNTER — OUTPATIENT (OUTPATIENT)
Dept: OUTPATIENT SERVICES | Facility: HOSPITAL | Age: 68
LOS: 1 days | End: 2023-07-25
Payer: COMMERCIAL

## 2023-07-25 VITALS
HEART RATE: 76 BPM | OXYGEN SATURATION: 100 % | HEIGHT: 66 IN | SYSTOLIC BLOOD PRESSURE: 134 MMHG | DIASTOLIC BLOOD PRESSURE: 83 MMHG | RESPIRATION RATE: 15 BRPM | WEIGHT: 167.99 LBS | TEMPERATURE: 98 F

## 2023-07-25 DIAGNOSIS — G47.33 OBSTRUCTIVE SLEEP APNEA (ADULT) (PEDIATRIC): ICD-10-CM

## 2023-07-25 DIAGNOSIS — Z98.890 OTHER SPECIFIED POSTPROCEDURAL STATES: Chronic | ICD-10-CM

## 2023-07-25 DIAGNOSIS — L91.8 OTHER HYPERTROPHIC DISORDERS OF THE SKIN: ICD-10-CM

## 2023-07-25 DIAGNOSIS — D25.9 LEIOMYOMA OF UTERUS, UNSPECIFIED: ICD-10-CM

## 2023-07-25 DIAGNOSIS — E11.9 TYPE 2 DIABETES MELLITUS WITHOUT COMPLICATIONS: ICD-10-CM

## 2023-07-25 DIAGNOSIS — Z98.49 CATARACT EXTRACTION STATUS, UNSPECIFIED EYE: Chronic | ICD-10-CM

## 2023-07-25 DIAGNOSIS — D21.9 BENIGN NEOPLASM OF CONNECTIVE AND OTHER SOFT TISSUE, UNSPECIFIED: ICD-10-CM

## 2023-07-25 DIAGNOSIS — I10 ESSENTIAL (PRIMARY) HYPERTENSION: ICD-10-CM

## 2023-07-25 LAB
APPEARANCE UR: CLEAR — SIGNIFICANT CHANGE UP
BACTERIA # UR AUTO: NEGATIVE /HPF — SIGNIFICANT CHANGE UP
BILIRUB UR-MCNC: NEGATIVE — SIGNIFICANT CHANGE UP
BLD GP AB SCN SERPL QL: NEGATIVE — SIGNIFICANT CHANGE UP
CAST: 0 /LPF — SIGNIFICANT CHANGE UP (ref 0–4)
COLOR SPEC: YELLOW — SIGNIFICANT CHANGE UP
DIFF PNL FLD: NEGATIVE — SIGNIFICANT CHANGE UP
GLUCOSE UR QL: NEGATIVE MG/DL — SIGNIFICANT CHANGE UP
KETONES UR-MCNC: ABNORMAL MG/DL
LEUKOCYTE ESTERASE UR-ACNC: ABNORMAL
NITRITE UR-MCNC: NEGATIVE — SIGNIFICANT CHANGE UP
PH UR: 6.5 — SIGNIFICANT CHANGE UP (ref 5–8)
PROT UR-MCNC: SIGNIFICANT CHANGE UP MG/DL
RBC CASTS # UR COMP ASSIST: 9 /HPF — HIGH (ref 0–4)
REVIEW: SIGNIFICANT CHANGE UP
RH IG SCN BLD-IMP: POSITIVE — SIGNIFICANT CHANGE UP
SP GR SPEC: 1.02 — SIGNIFICANT CHANGE UP (ref 1–1.03)
SQUAMOUS # UR AUTO: 2 /HPF — SIGNIFICANT CHANGE UP (ref 0–5)
UROBILINOGEN FLD QL: 0.2 MG/DL — SIGNIFICANT CHANGE UP (ref 0.2–1)
WBC UR QL: 0 /HPF — SIGNIFICANT CHANGE UP (ref 0–5)

## 2023-07-25 PROCEDURE — 93010 ELECTROCARDIOGRAM REPORT: CPT

## 2023-07-25 RX ORDER — AMLODIPINE BESYLATE AND BENAZEPRIL HYDROCHLORIDE 10; 20 MG/1; MG/1
1 CAPSULE ORAL
Qty: 0 | Refills: 0 | DISCHARGE

## 2023-07-25 RX ORDER — OMEGA-3 ACID ETHYL ESTERS 1 G
2 CAPSULE ORAL
Qty: 0 | Refills: 0 | DISCHARGE

## 2023-07-25 RX ORDER — METFORMIN HYDROCHLORIDE 850 MG/1
1 TABLET ORAL
Qty: 0 | Refills: 0 | DISCHARGE

## 2023-07-25 RX ORDER — ROSUVASTATIN CALCIUM 5 MG/1
1 TABLET ORAL
Qty: 0 | Refills: 0 | DISCHARGE

## 2023-07-25 RX ORDER — CHLORHEXIDINE GLUCONATE 213 G/1000ML
1 SOLUTION TOPICAL ONCE
Refills: 0 | Status: DISCONTINUED | OUTPATIENT
Start: 2023-08-24 | End: 2023-08-24

## 2023-07-25 RX ORDER — SODIUM CHLORIDE 9 MG/ML
1000 INJECTION, SOLUTION INTRAVENOUS
Refills: 0 | Status: DISCONTINUED | OUTPATIENT
Start: 2023-08-24 | End: 2023-08-24

## 2023-07-25 NOTE — H&P PST ADULT - ATTENDING COMMENTS
Seen in ASU. She reports no changes to her condition. Planned procedure disc, incl VI and poss staging. Consent formed reviewed. All Q/A. Case d/w Anesth attd and OR circ RN.

## 2023-07-25 NOTE — H&P PST ADULT - PROBLEM SELECTOR PLAN 3
Patient instructed to hold Metformin on the morning of procedure. Pt stated understanding.  A1C results in chart  Check fingerstick DOS

## 2023-07-25 NOTE — H&P PST ADULT - NSANTHOSAYNRD_GEN_A_CORE
No. PRISCILLA screening performed.  STOP BANG Legend: 0-2 = LOW Risk; 3-4 = INTERMEDIATE Risk; 5-8 = HIGH Risk

## 2023-07-25 NOTE — H&P PST ADULT - FUNCTIONAL STATUS
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

## 2023-07-25 NOTE — H&P PST ADULT - HISTORY OF PRESENT ILLNESS
68 year old female  with HTN, HLD, DM2, presents to PST, with pre op diagnosis of Uterine fibroids, for pre op evaluation prior to scheduled surgery- Exploratory Laparotomy, total abdominal hysterectomy, bilateral salpingo oophorectomy, possible staging, Cysto/ with Dr Ly,

## 2023-07-25 NOTE — H&P PST ADULT - NSICDXFAMILYHX_GEN_ALL_CORE_FT
FAMILY HISTORY:  Sibling  Still living? Unknown  FHx: lung cancer, Age at diagnosis: Age Unknown

## 2023-07-25 NOTE — H&P PST ADULT - NS HP PST ANES REACTION OTHER FT
patient reports that her daughter was unable to move her Lower extremities for several hours after surgery.

## 2023-07-25 NOTE — H&P PST ADULT - GENITOURINARY COMMENTS
pre op diagnosis - thickened endometrium, PMB, Uterine fibroids patient reports of large uterine fibroids, PMB

## 2023-07-25 NOTE — H&P PST ADULT - PROBLEM SELECTOR PLAN 1
Patient is tentatively scheduled surgery- Exploratory Laparotomy, total abdominal hysterectomy, bilateral salpingo oophorectomy, possible staging, Cysto/ with Dr Ly on 08/3/23.    Pre-op instructions provided. Pt given verbal and written instructions with teach back on chlorhexidine wash and Pepcid. Pt verbalized understanding with return demonstration.    Recent CBC, CMP, A1C results in chart  T&S, UA, Urine culture sent

## 2023-07-27 LAB
CULTURE RESULTS: SIGNIFICANT CHANGE UP
SPECIMEN SOURCE: SIGNIFICANT CHANGE UP

## 2023-08-01 ENCOUNTER — TRANSCRIPTION ENCOUNTER (OUTPATIENT)
Age: 68
End: 2023-08-01

## 2023-08-01 PROBLEM — L91.8 SKIN TAG: Status: ACTIVE | Noted: 2023-01-30

## 2023-08-01 PROBLEM — N95.0 POSTMENOPAUSAL BLEEDING: Status: ACTIVE | Noted: 2023-01-30

## 2023-08-01 PROBLEM — R93.89 THICKENED ENDOMETRIUM: Status: ACTIVE | Noted: 2023-01-30

## 2023-08-01 PROBLEM — R14.0 ABDOMINAL DISTENSION: Status: ACTIVE | Noted: 2023-01-30

## 2023-08-07 ENCOUNTER — APPOINTMENT (OUTPATIENT)
Dept: GYNECOLOGIC ONCOLOGY | Facility: CLINIC | Age: 68
End: 2023-08-07
Payer: COMMERCIAL

## 2023-08-07 ENCOUNTER — NON-APPOINTMENT (OUTPATIENT)
Age: 68
End: 2023-08-07

## 2023-08-07 VITALS
WEIGHT: 168.25 LBS | HEART RATE: 94 BPM | DIASTOLIC BLOOD PRESSURE: 82 MMHG | SYSTOLIC BLOOD PRESSURE: 161 MMHG | HEIGHT: 66 IN | BODY MASS INDEX: 27.04 KG/M2

## 2023-08-07 DIAGNOSIS — L91.8 OTHER HYPERTROPHIC DISORDERS OF THE SKIN: ICD-10-CM

## 2023-08-07 DIAGNOSIS — R93.89 ABNORMAL FINDINGS ON DIAGNOSTIC IMAGING OF OTHER SPECIFIED BODY STRUCTURES: ICD-10-CM

## 2023-08-07 DIAGNOSIS — R14.0 ABDOMINAL DISTENSION (GASEOUS): ICD-10-CM

## 2023-08-07 DIAGNOSIS — N95.0 POSTMENOPAUSAL BLEEDING: ICD-10-CM

## 2023-08-07 PROCEDURE — 99214 OFFICE O/P EST MOD 30 MIN: CPT

## 2023-08-08 PROBLEM — H40.9 UNSPECIFIED GLAUCOMA: Chronic | Status: ACTIVE | Noted: 2023-07-25

## 2023-08-14 ENCOUNTER — APPOINTMENT (OUTPATIENT)
Dept: UROLOGY | Facility: CLINIC | Age: 68
End: 2023-08-14
Payer: COMMERCIAL

## 2023-08-14 ENCOUNTER — APPOINTMENT (OUTPATIENT)
Dept: ULTRASOUND IMAGING | Facility: CLINIC | Age: 68
End: 2023-08-14
Payer: COMMERCIAL

## 2023-08-14 ENCOUNTER — OUTPATIENT (OUTPATIENT)
Dept: OUTPATIENT SERVICES | Facility: HOSPITAL | Age: 68
LOS: 1 days | End: 2023-08-14
Payer: COMMERCIAL

## 2023-08-14 VITALS
RESPIRATION RATE: 17 BRPM | WEIGHT: 168 LBS | HEIGHT: 67 IN | HEART RATE: 92 BPM | BODY MASS INDEX: 26.37 KG/M2 | SYSTOLIC BLOOD PRESSURE: 151 MMHG | DIASTOLIC BLOOD PRESSURE: 75 MMHG

## 2023-08-14 DIAGNOSIS — N28.1 CYST OF KIDNEY, ACQUIRED: ICD-10-CM

## 2023-08-14 DIAGNOSIS — Z98.890 OTHER SPECIFIED POSTPROCEDURAL STATES: Chronic | ICD-10-CM

## 2023-08-14 DIAGNOSIS — Z98.49 CATARACT EXTRACTION STATUS, UNSPECIFIED EYE: Chronic | ICD-10-CM

## 2023-08-14 DIAGNOSIS — D21.9 BENIGN NEOPLASM OF CONNECTIVE AND OTHER SOFT TISSUE, UNSPECIFIED: ICD-10-CM

## 2023-08-14 PROCEDURE — 76830 TRANSVAGINAL US NON-OB: CPT | Mod: 26

## 2023-08-14 PROCEDURE — 76830 TRANSVAGINAL US NON-OB: CPT

## 2023-08-14 PROCEDURE — 99213 OFFICE O/P EST LOW 20 MIN: CPT

## 2023-08-14 NOTE — ASSESSMENT
[FreeTextEntry1] : 2/25/23 Had CT A/P w po and iv contrast showing normal kidneys with renal cysts.  Explained anatomy and what intra op stents are for, and how they are placed.  She had all questions anwered and understands she may have blood in urine. We would remove one at a time, sequentially: usually one the night of surgery, or at end of stream and then the next morning.

## 2023-08-14 NOTE — HISTORY OF PRESENT ILLNESS
[FreeTextEntry1] : ARIANA JONES is a 68 year old F who presents with h/o microscopic hematuria. In 2/23/23, UA showed no blood cells and there were uric acid crystals but there was a negative culture.  Pt is due for ASIA with Dr. Ly for large fibroids and thickened endometrium. Bx in 2/23 was benign.  Her Gyn Onc is requesting u caths for the procedure.  She has no visible blood, no recent S/sx of UTI and also has had no infections.

## 2023-08-15 LAB
APPEARANCE: CLEAR
BILIRUBIN URINE: NEGATIVE
BLOOD URINE: NEGATIVE
COLOR: YELLOW
GLUCOSE QUALITATIVE U: NEGATIVE MG/DL
KETONES URINE: ABNORMAL MG/DL
LEUKOCYTE ESTERASE URINE: NEGATIVE
NITRITE URINE: NEGATIVE
PH URINE: 6
PROTEIN URINE: NEGATIVE MG/DL
SPECIFIC GRAVITY URINE: 1.02
UROBILINOGEN URINE: 0.2 MG/DL

## 2023-08-16 LAB — BACTERIA UR CULT: NORMAL

## 2023-08-16 NOTE — PHYSICAL EXAM
[Chaperone Present] : A chaperone was present in the examining room during all aspects of the physical examination [Absent] : CVAT: absent [Abnormal] : Bimanual Exam: Abnormal [Normal] : Recto-Vaginal Exam: Normal [Fully active, able to carry on all pre-disease performance without restriction] : Status 0 - Fully active, able to carry on all pre-disease performance without restriction [FreeTextEntry1] : J [de-identified] : Trace edema [de-identified] : Lower abdominal mass, nontender [de-identified] : Left gluteal skin tag on a thin stalk noted [de-identified] : 1.5 cm skin tag by the left gluteal area on a thin stalk which patient says is of longstanding. [de-identified] : Parous and without lesions [de-identified] : The uterus had a full lower uterine segment and extended to the umbilical region [de-identified] : The adnexa were nonpalpable separate from the uterus [de-identified] : The uterus was enlarged to approximately 20 weeks gestational size with a full lower uterine segment

## 2023-08-16 NOTE — REVIEW OF SYSTEMS
[Negative] : Musculoskeletal [Diabetes] : diabetes mellitus [FreeTextEntry4] : PMB [FreeTextEntry8] : glaucoma [de-identified] : HTN, HLD

## 2023-08-16 NOTE — HISTORY OF PRESENT ILLNESS
[FreeTextEntry1] : Referring GYN - Dr. Liz Leonard PCP - Dr. Rae Bentley GI - Dr. Kruse Uro - Dr. Tigist Story Cards - Dr. Nazario UroGYN - Dr. Brice  Ms. Koenig presents for exam and surgical planning. CT A/P as below.   She is a 66 yo  postmenopausal female who presented 1/2023 for initial consultation at the request of Dr. Leonard for the management of PMB. She has a known history of fibroids since her first pregnancy. She was recently seen for annual GYN exam at which time TVUS revealed an enlarged fibroid uterus. EMBx was benign. Pelvic MRI confirmed the presents of multiple fibroid and thickened EML 7 mm. She denies any PMB or discharge. She desires definitive intervention. She was referred here for further management.   PATHOLOGY: EMBx, 11/30/22, Fabi murphy) mostly benign cervical tissue with rare inactive endometrial tissue  IMAGING: CT A/P on 2/25/23:  LOWER CHEST: Within normal limits. LIVER: Subcentimeter hypodensity in the anterior right hepatic lobe too small to characterize. BILE DUCTS: Normal caliber. GALLBLADDER: Within normal limits. SPLEEN: Within normal limits. PANCREAS: Within normal limits. ADRENALS: Within normal limits. KIDNEYS/URETERS: Kidneys enhance bilaterally and symmetrically without hydronephrosis. Adjacent right upper pole renal cysts versus a larger cyst with thin septation. Several left renal cysts.. Additional subcentimeter hypodensities too small to characterize bilaterally. No intrarenal calculi. The ureters are unremarkable. VISUALIZED PORTIONS: BOWEL: Within normal limits. PERITONEUM: Partially visualized large subserosal fibroids some of which contain calcification. VESSELS: Within normal limits. RETROPERITONEUM/LYMPH NODES: No lymphadenopathy. ABDOMINAL WALL: Within normal limits. BONES: Degenerative changes. IMPRESSION: Bilateral renal cysts, a right upper pole renal cyst which may represent a large cyst with thin septations versus adjacent cysts. Partially visualized fibroid uterus as seen on prior MRI of 1/20/2023   Pelvic MRI on 1/20/23 ([facility]): AV uterus 9.5 x 16.8 x 15.7cm. Numerous fibrods. Largest 9.5 x 7.1 x 9.4 cm transmural fibroid in the anterior ASIM with 3 x 5 cm base. There is a 3.5 x 3.3 x 2.9 cm submucosal fibroid in the posterior body with mild enhancement. There is a 2.2 x 2.3 x 2.4 cm intramural fibroid in the anterior body with calcification and cystic degeneration without enhancement. EML 7 mm.  RTO - 2.0 x 2.1 x 2.4 cm LTO - 2.0 x 1.5 x 2.2 cm No ascites or LAD.   TVUS on 3/17/22 (R): AV uterus 13 x 6.4 x 8.1 cm. Three myomas: 6.2 x 5.3 x 6.0 cm. Intramural submucosal 3.5 x 3.7 x 3.5 cm. Posterior mid uterine intramural submucosal 4.6 x 5.0 x 5.3 cm. EML cannot be visualized. Neither ovary is seen.    PMHx: HTN, HLD, DM, glaucoma PSHx: N/A Fam Hx: sister (lung cancer), brother (sarcoma)   HCM: PAP on 2/3/20: NEGATIVE Mammogram: 6/2021 - BIRADS 2 CCS: 5/2023- benign - repeat in 5 years DEXA: 11/2020 - normal COVID-19 vaccine series completed

## 2023-08-16 NOTE — DISCUSSION/SUMMARY
[Reviewed Clinical Lab Test(s)] : Results of clinical tests were reviewed. [Reviewed Radiology Report(s)] : Radiology reports were reviewed. [Reviewed Radiology Film/Image(s)] : Images from radiology studies were reviewed and examined. [FreeTextEntry1] : -I met with the pt. -We discussed the clinical findings and nature of uterine fibroids.  GIven the interval from her imaging, we plan a sono; Rx provided.    -Management options were again reviewed, including my advise for an abdominal exploration through a vertical incision with hysterectomy, BSO, and we again discussed the role and nature of staging.   -She again notes wishing for the skin tag to be removed. We discussed what this would entail. -I again disc my rationale for recommending use of ureteral catheters and the need for a urology consultation.  -Periop and recuperative issues were discussed, as were the possible hazards and risks of surgery, including but not limited to infection, thromboembolic disease and its life-threatening nature, transfusion, and surrounding organ injury (urinary, bowel, vascular, and neural), incision issues. -A diagram was supplemented and a copy provided. -I advised a medical evaluation for preoperative clearance -GI eval was completed. -Her instructions were reviewed. -All Q/A. -She will contact  re scheduling.

## 2023-08-18 ENCOUNTER — APPOINTMENT (OUTPATIENT)
Dept: GYNECOLOGIC ONCOLOGY | Facility: CLINIC | Age: 68
End: 2023-08-18

## 2023-08-23 ENCOUNTER — TRANSCRIPTION ENCOUNTER (OUTPATIENT)
Age: 68
End: 2023-08-23

## 2023-08-23 NOTE — ASU PATIENT PROFILE, ADULT - NSCAFFEAMTFREQ_GEN_ALL_CORE_SD
----- Message from KIERSTEN Thorpe sent at 2/15/2018  8:41 PM EST -----  Vitamin D is very low.  I am going to send in Rx strength D3 that he will take once a week x 8 weeks, then he will need to take OTC D3.4000 units daily.  All other labs are stable.  
Patient has been notified!  
occasional use

## 2023-08-24 ENCOUNTER — APPOINTMENT (OUTPATIENT)
Dept: GYNECOLOGIC ONCOLOGY | Facility: HOSPITAL | Age: 68
End: 2023-08-24

## 2023-08-24 ENCOUNTER — RESULT REVIEW (OUTPATIENT)
Age: 68
End: 2023-08-24

## 2023-08-24 ENCOUNTER — TRANSCRIPTION ENCOUNTER (OUTPATIENT)
Age: 68
End: 2023-08-24

## 2023-08-24 ENCOUNTER — INPATIENT (INPATIENT)
Facility: HOSPITAL | Age: 68
LOS: 2 days | Discharge: HOME | End: 2023-08-27
Attending: OBSTETRICS & GYNECOLOGY | Admitting: OBSTETRICS & GYNECOLOGY
Payer: COMMERCIAL

## 2023-08-24 VITALS
HEIGHT: 66 IN | DIASTOLIC BLOOD PRESSURE: 74 MMHG | SYSTOLIC BLOOD PRESSURE: 128 MMHG | OXYGEN SATURATION: 100 % | HEART RATE: 81 BPM | RESPIRATION RATE: 16 BRPM | TEMPERATURE: 98 F | WEIGHT: 167.99 LBS

## 2023-08-24 DIAGNOSIS — L91.8 OTHER HYPERTROPHIC DISORDERS OF THE SKIN: ICD-10-CM

## 2023-08-24 DIAGNOSIS — Z98.890 OTHER SPECIFIED POSTPROCEDURAL STATES: Chronic | ICD-10-CM

## 2023-08-24 DIAGNOSIS — Z98.49 CATARACT EXTRACTION STATUS, UNSPECIFIED EYE: Chronic | ICD-10-CM

## 2023-08-24 LAB
ALBUMIN SERPL ELPH-MCNC: 5.1 G/DL — HIGH (ref 3.3–5)
ALP SERPL-CCNC: 76 U/L — SIGNIFICANT CHANGE UP (ref 40–120)
ALT FLD-CCNC: 19 U/L — SIGNIFICANT CHANGE UP (ref 4–33)
ANION GAP SERPL CALC-SCNC: 14 MMOL/L — SIGNIFICANT CHANGE UP (ref 7–14)
ANION GAP SERPL CALC-SCNC: 14 MMOL/L — SIGNIFICANT CHANGE UP (ref 7–14)
AST SERPL-CCNC: 20 U/L — SIGNIFICANT CHANGE UP (ref 4–32)
BASOPHILS # BLD AUTO: 0.04 K/UL — SIGNIFICANT CHANGE UP (ref 0–0.2)
BASOPHILS NFR BLD AUTO: 0.7 % — SIGNIFICANT CHANGE UP (ref 0–2)
BILIRUB SERPL-MCNC: 0.4 MG/DL — SIGNIFICANT CHANGE UP (ref 0.2–1.2)
BLD GP AB SCN SERPL QL: NEGATIVE — SIGNIFICANT CHANGE UP
BUN SERPL-MCNC: 12 MG/DL — SIGNIFICANT CHANGE UP (ref 7–23)
BUN SERPL-MCNC: 13 MG/DL — SIGNIFICANT CHANGE UP (ref 7–23)
CALCIUM SERPL-MCNC: 10.1 MG/DL — SIGNIFICANT CHANGE UP (ref 8.4–10.5)
CALCIUM SERPL-MCNC: 9 MG/DL — SIGNIFICANT CHANGE UP (ref 8.4–10.5)
CHLORIDE SERPL-SCNC: 105 MMOL/L — SIGNIFICANT CHANGE UP (ref 98–107)
CHLORIDE SERPL-SCNC: 105 MMOL/L — SIGNIFICANT CHANGE UP (ref 98–107)
CO2 SERPL-SCNC: 21 MMOL/L — LOW (ref 22–31)
CO2 SERPL-SCNC: 23 MMOL/L — SIGNIFICANT CHANGE UP (ref 22–31)
CREAT SERPL-MCNC: 0.87 MG/DL — SIGNIFICANT CHANGE UP (ref 0.5–1.3)
CREAT SERPL-MCNC: 0.95 MG/DL — SIGNIFICANT CHANGE UP (ref 0.5–1.3)
EGFR: 66 ML/MIN/1.73M2 — SIGNIFICANT CHANGE UP
EGFR: 73 ML/MIN/1.73M2 — SIGNIFICANT CHANGE UP
EOSINOPHIL # BLD AUTO: 0.33 K/UL — SIGNIFICANT CHANGE UP (ref 0–0.5)
EOSINOPHIL NFR BLD AUTO: 5.8 % — SIGNIFICANT CHANGE UP (ref 0–6)
GLUCOSE BLDC GLUCOMTR-MCNC: 107 MG/DL — HIGH (ref 70–99)
GLUCOSE BLDC GLUCOMTR-MCNC: 117 MG/DL — HIGH (ref 70–99)
GLUCOSE BLDC GLUCOMTR-MCNC: 118 MG/DL — HIGH (ref 70–99)
GLUCOSE BLDC GLUCOMTR-MCNC: 128 MG/DL — HIGH (ref 70–99)
GLUCOSE SERPL-MCNC: 117 MG/DL — HIGH (ref 70–99)
GLUCOSE SERPL-MCNC: 146 MG/DL — HIGH (ref 70–99)
HCT VFR BLD CALC: 39.5 % — SIGNIFICANT CHANGE UP (ref 34.5–45)
HCT VFR BLD CALC: 44.2 % — SIGNIFICANT CHANGE UP (ref 34.5–45)
HGB BLD-MCNC: 12.7 G/DL — SIGNIFICANT CHANGE UP (ref 11.5–15.5)
HGB BLD-MCNC: 14.4 G/DL — SIGNIFICANT CHANGE UP (ref 11.5–15.5)
IANC: 2.59 K/UL — SIGNIFICANT CHANGE UP (ref 1.8–7.4)
IMM GRANULOCYTES NFR BLD AUTO: 0.4 % — SIGNIFICANT CHANGE UP (ref 0–0.9)
LYMPHOCYTES # BLD AUTO: 2.31 K/UL — SIGNIFICANT CHANGE UP (ref 1–3.3)
LYMPHOCYTES # BLD AUTO: 40.5 % — SIGNIFICANT CHANGE UP (ref 13–44)
MCHC RBC-ENTMCNC: 29 PG — SIGNIFICANT CHANGE UP (ref 27–34)
MCHC RBC-ENTMCNC: 29 PG — SIGNIFICANT CHANGE UP (ref 27–34)
MCHC RBC-ENTMCNC: 32.2 GM/DL — SIGNIFICANT CHANGE UP (ref 32–36)
MCHC RBC-ENTMCNC: 32.6 GM/DL — SIGNIFICANT CHANGE UP (ref 32–36)
MCV RBC AUTO: 89.1 FL — SIGNIFICANT CHANGE UP (ref 80–100)
MCV RBC AUTO: 90.2 FL — SIGNIFICANT CHANGE UP (ref 80–100)
MONOCYTES # BLD AUTO: 0.41 K/UL — SIGNIFICANT CHANGE UP (ref 0–0.9)
MONOCYTES NFR BLD AUTO: 7.2 % — SIGNIFICANT CHANGE UP (ref 2–14)
NEUTROPHILS # BLD AUTO: 2.59 K/UL — SIGNIFICANT CHANGE UP (ref 1.8–7.4)
NEUTROPHILS NFR BLD AUTO: 45.4 % — SIGNIFICANT CHANGE UP (ref 43–77)
NRBC # BLD: 0 /100 WBCS — SIGNIFICANT CHANGE UP (ref 0–0)
NRBC # BLD: 0 /100 WBCS — SIGNIFICANT CHANGE UP (ref 0–0)
NRBC # FLD: 0 K/UL — SIGNIFICANT CHANGE UP (ref 0–0)
NRBC # FLD: 0 K/UL — SIGNIFICANT CHANGE UP (ref 0–0)
PLATELET # BLD AUTO: 263 K/UL — SIGNIFICANT CHANGE UP (ref 150–400)
PLATELET # BLD AUTO: 286 K/UL — SIGNIFICANT CHANGE UP (ref 150–400)
POTASSIUM SERPL-MCNC: 3.6 MMOL/L — SIGNIFICANT CHANGE UP (ref 3.5–5.3)
POTASSIUM SERPL-MCNC: 3.8 MMOL/L — SIGNIFICANT CHANGE UP (ref 3.5–5.3)
POTASSIUM SERPL-SCNC: 3.6 MMOL/L — SIGNIFICANT CHANGE UP (ref 3.5–5.3)
POTASSIUM SERPL-SCNC: 3.8 MMOL/L — SIGNIFICANT CHANGE UP (ref 3.5–5.3)
PROT SERPL-MCNC: 9.1 G/DL — HIGH (ref 6–8.3)
RBC # BLD: 4.38 M/UL — SIGNIFICANT CHANGE UP (ref 3.8–5.2)
RBC # BLD: 4.96 M/UL — SIGNIFICANT CHANGE UP (ref 3.8–5.2)
RBC # FLD: 12.8 % — SIGNIFICANT CHANGE UP (ref 10.3–14.5)
RBC # FLD: 12.8 % — SIGNIFICANT CHANGE UP (ref 10.3–14.5)
RH IG SCN BLD-IMP: POSITIVE — SIGNIFICANT CHANGE UP
SODIUM SERPL-SCNC: 140 MMOL/L — SIGNIFICANT CHANGE UP (ref 135–145)
SODIUM SERPL-SCNC: 142 MMOL/L — SIGNIFICANT CHANGE UP (ref 135–145)
WBC # BLD: 12.22 K/UL — HIGH (ref 3.8–10.5)
WBC # BLD: 5.7 K/UL — SIGNIFICANT CHANGE UP (ref 3.8–10.5)
WBC # FLD AUTO: 12.22 K/UL — HIGH (ref 3.8–10.5)
WBC # FLD AUTO: 5.7 K/UL — SIGNIFICANT CHANGE UP (ref 3.8–10.5)

## 2023-08-24 PROCEDURE — 52005 CYSTO W/URTRL CATHJ: CPT

## 2023-08-24 PROCEDURE — 88307 TISSUE EXAM BY PATHOLOGIST: CPT | Mod: 26

## 2023-08-24 PROCEDURE — 49203: CPT

## 2023-08-24 PROCEDURE — 88332 PATH CONSLTJ SURG EA ADD BLK: CPT | Mod: 26

## 2023-08-24 PROCEDURE — 88112 CYTOPATH CELL ENHANCE TECH: CPT | Mod: 26

## 2023-08-24 PROCEDURE — 88305 TISSUE EXAM BY PATHOLOGIST: CPT | Mod: 26

## 2023-08-24 PROCEDURE — 88304 TISSUE EXAM BY PATHOLOGIST: CPT | Mod: 26

## 2023-08-24 PROCEDURE — 88331 PATH CONSLTJ SURG 1 BLK 1SPC: CPT | Mod: 26

## 2023-08-24 PROCEDURE — 58150 TOTAL HYSTERECTOMY: CPT

## 2023-08-24 PROCEDURE — 88311 DECALCIFY TISSUE: CPT | Mod: 26

## 2023-08-24 DEVICE — GUIDEWIRE SENSOR DUAL-FLEX NITINOL STRAIGHT .038" X 150CM: Type: IMPLANTABLE DEVICE | Status: FUNCTIONAL

## 2023-08-24 DEVICE — SURGICEL FIBRILLAR 2 X 4": Type: IMPLANTABLE DEVICE | Status: FUNCTIONAL

## 2023-08-24 DEVICE — VISTASEAL FIBRIN HUMAN 10ML: Type: IMPLANTABLE DEVICE | Status: FUNCTIONAL

## 2023-08-24 DEVICE — URETERAL CATH OPEN END 5FR 70CM: Type: IMPLANTABLE DEVICE | Status: FUNCTIONAL

## 2023-08-24 RX ORDER — SODIUM CHLORIDE 9 MG/ML
1000 INJECTION, SOLUTION INTRAVENOUS
Refills: 0 | Status: DISCONTINUED | OUTPATIENT
Start: 2023-08-24 | End: 2023-08-27

## 2023-08-24 RX ORDER — DEXTROSE 50 % IN WATER 50 %
12.5 SYRINGE (ML) INTRAVENOUS ONCE
Refills: 0 | Status: DISCONTINUED | OUTPATIENT
Start: 2023-08-24 | End: 2023-08-27

## 2023-08-24 RX ORDER — DEXTROSE 50 % IN WATER 50 %
25 SYRINGE (ML) INTRAVENOUS ONCE
Refills: 0 | Status: DISCONTINUED | OUTPATIENT
Start: 2023-08-24 | End: 2023-08-27

## 2023-08-24 RX ORDER — GLUCAGON INJECTION, SOLUTION 0.5 MG/.1ML
1 INJECTION, SOLUTION SUBCUTANEOUS ONCE
Refills: 0 | Status: DISCONTINUED | OUTPATIENT
Start: 2023-08-24 | End: 2023-08-27

## 2023-08-24 RX ORDER — KETOROLAC TROMETHAMINE 30 MG/ML
30 SYRINGE (ML) INJECTION EVERY 6 HOURS
Refills: 0 | Status: DISCONTINUED | OUTPATIENT
Start: 2023-08-24 | End: 2023-08-25

## 2023-08-24 RX ORDER — DULAGLUTIDE 4.5 MG/.5ML
1.5 INJECTION, SOLUTION SUBCUTANEOUS
Refills: 0 | DISCHARGE

## 2023-08-24 RX ORDER — SIMETHICONE 80 MG/1
80 TABLET, CHEWABLE ORAL
Refills: 0 | Status: DISCONTINUED | OUTPATIENT
Start: 2023-08-24 | End: 2023-08-27

## 2023-08-24 RX ORDER — KETOROLAC TROMETHAMINE 30 MG/ML
15 SYRINGE (ML) INJECTION EVERY 6 HOURS
Refills: 0 | Status: DISCONTINUED | OUTPATIENT
Start: 2023-08-24 | End: 2023-08-24

## 2023-08-24 RX ORDER — DEXTROSE 50 % IN WATER 50 %
15 SYRINGE (ML) INTRAVENOUS ONCE
Refills: 0 | Status: DISCONTINUED | OUTPATIENT
Start: 2023-08-24 | End: 2023-08-27

## 2023-08-24 RX ORDER — OXYCODONE HYDROCHLORIDE 5 MG/1
5 TABLET ORAL EVERY 4 HOURS
Refills: 0 | Status: DISCONTINUED | OUTPATIENT
Start: 2023-08-24 | End: 2023-08-24

## 2023-08-24 RX ORDER — ONDANSETRON 8 MG/1
4 TABLET, FILM COATED ORAL ONCE
Refills: 0 | Status: DISCONTINUED | OUTPATIENT
Start: 2023-08-24 | End: 2023-08-24

## 2023-08-24 RX ORDER — HEPARIN SODIUM 5000 [USP'U]/ML
5000 INJECTION INTRAVENOUS; SUBCUTANEOUS EVERY 8 HOURS
Refills: 0 | Status: DISCONTINUED | OUTPATIENT
Start: 2023-08-24 | End: 2023-08-25

## 2023-08-24 RX ORDER — FENTANYL CITRATE 50 UG/ML
25 INJECTION INTRAVENOUS
Refills: 0 | Status: DISCONTINUED | OUTPATIENT
Start: 2023-08-24 | End: 2023-08-24

## 2023-08-24 RX ORDER — SENNA PLUS 8.6 MG/1
1 TABLET ORAL AT BEDTIME
Refills: 0 | Status: DISCONTINUED | OUTPATIENT
Start: 2023-08-24 | End: 2023-08-27

## 2023-08-24 RX ORDER — ROSUVASTATIN CALCIUM 5 MG/1
1 TABLET ORAL
Refills: 0 | DISCHARGE

## 2023-08-24 RX ORDER — INSULIN LISPRO 100/ML
VIAL (ML) SUBCUTANEOUS AT BEDTIME
Refills: 0 | Status: DISCONTINUED | OUTPATIENT
Start: 2023-08-24 | End: 2023-08-27

## 2023-08-24 RX ORDER — HYDROMORPHONE HYDROCHLORIDE 2 MG/ML
0.25 INJECTION INTRAMUSCULAR; INTRAVENOUS; SUBCUTANEOUS
Refills: 0 | Status: DISCONTINUED | OUTPATIENT
Start: 2023-08-24 | End: 2023-08-24

## 2023-08-24 RX ORDER — INSULIN LISPRO 100/ML
VIAL (ML) SUBCUTANEOUS
Refills: 0 | Status: DISCONTINUED | OUTPATIENT
Start: 2023-08-24 | End: 2023-08-27

## 2023-08-24 RX ORDER — ONDANSETRON 8 MG/1
4 TABLET, FILM COATED ORAL EVERY 6 HOURS
Refills: 0 | Status: DISCONTINUED | OUTPATIENT
Start: 2023-08-24 | End: 2023-08-27

## 2023-08-24 RX ORDER — ACETAMINOPHEN 500 MG
1000 TABLET ORAL ONCE
Refills: 0 | Status: DISCONTINUED | OUTPATIENT
Start: 2023-08-24 | End: 2023-08-25

## 2023-08-24 RX ORDER — METOPROLOL TARTRATE 50 MG
5 TABLET ORAL EVERY 6 HOURS
Refills: 0 | Status: DISCONTINUED | OUTPATIENT
Start: 2023-08-24 | End: 2023-08-27

## 2023-08-24 RX ORDER — HYDROMORPHONE HYDROCHLORIDE 2 MG/ML
0.5 INJECTION INTRAMUSCULAR; INTRAVENOUS; SUBCUTANEOUS EVERY 6 HOURS
Refills: 0 | Status: DISCONTINUED | OUTPATIENT
Start: 2023-08-24 | End: 2023-08-25

## 2023-08-24 RX ORDER — SODIUM CHLORIDE 9 MG/ML
1000 INJECTION, SOLUTION INTRAVENOUS
Refills: 0 | Status: DISCONTINUED | OUTPATIENT
Start: 2023-08-24 | End: 2023-08-26

## 2023-08-24 RX ORDER — METFORMIN HYDROCHLORIDE 850 MG/1
2 TABLET ORAL
Refills: 0 | DISCHARGE

## 2023-08-24 RX ORDER — AMLODIPINE BESYLATE AND BENAZEPRIL HYDROCHLORIDE 10; 20 MG/1; MG/1
1 CAPSULE ORAL
Refills: 0 | DISCHARGE

## 2023-08-24 RX ORDER — BRIMONIDINE TARTRATE, TIMOLOL MALEATE 2; 5 MG/ML; MG/ML
1 SOLUTION/ DROPS OPHTHALMIC
Refills: 0 | DISCHARGE

## 2023-08-24 RX ORDER — ERGOCALCIFEROL 1.25 MG/1
0 CAPSULE ORAL
Refills: 0 | DISCHARGE

## 2023-08-24 RX ADMIN — Medication 15 MILLIGRAM(S): at 17:04

## 2023-08-24 RX ADMIN — SIMETHICONE 80 MILLIGRAM(S): 80 TABLET, CHEWABLE ORAL at 17:04

## 2023-08-24 RX ADMIN — FENTANYL CITRATE 25 MICROGRAM(S): 50 INJECTION INTRAVENOUS at 13:32

## 2023-08-24 RX ADMIN — FENTANYL CITRATE 25 MICROGRAM(S): 50 INJECTION INTRAVENOUS at 14:00

## 2023-08-24 RX ADMIN — Medication 15 MILLIGRAM(S): at 17:41

## 2023-08-24 RX ADMIN — SIMETHICONE 80 MILLIGRAM(S): 80 TABLET, CHEWABLE ORAL at 22:55

## 2023-08-24 RX ADMIN — HEPARIN SODIUM 5000 UNIT(S): 5000 INJECTION INTRAVENOUS; SUBCUTANEOUS at 22:56

## 2023-08-24 NOTE — ASU PREOP CHECKLIST - SELECT TESTS ORDERED
BMP/CBC/Type and Cross/Type and Screen/EKG/POCT Blood Glucose fs 107 @639a/BMP/CBC/Type and Cross/Type and Screen/EKG/POCT Blood Glucose

## 2023-08-24 NOTE — BRIEF OPERATIVE NOTE - NSICDXBRIEFPOSTOP_GEN_ALL_CORE_FT
POST-OP DIAGNOSIS:  Fibroid uterus 24-Aug-2023 13:41:28  Wade Ly  Endometrial polyp 24-Aug-2023 13:41:36  Wade Ly  Peritoneal lesion 24-Aug-2023 13:44:00  Wade Ly

## 2023-08-24 NOTE — CHART NOTE - NSCHARTNOTEFT_GEN_A_CORE
PA GynOn Post Op Note  Intraop EBL: 300cc  Intraop IVF: 1400cc  Intrao UOP: 600cc    Pt seen and examined at bedside in PACU resting comfortably.  Pt denies fever, chills, chest pain, SOB, nausea, vomiting, lightheadedness, dizziness.  Garcia catheter in place along with one UCath.     T(F): 97.6 (08-24-23 @ 14:30), Max: 97.9 (08-24-23 @ 06:26)  HR: 98 (08-24-23 @ 15:00) (76 - 98)  BP: 141/81 (08-24-23 @ 15:00) (107/59 - 141/81)  RR: 20 (08-24-23 @ 15:00) (13 - 20)  SpO2: 96% (08-24-23 @ 15:00) (94% - 100%)    I&O's Detail    24 Aug 2023 07:01  -  24 Aug 2023 15:07  --------------------------------------------------------  IN:    Lactated Ringers: 60 mL  Total IN: 60 mL    OUT:    Indwelling Catheter - Urethral (mL): 100 mL  Total OUT: 100 mL    Total NET: -40 mL      Physical Exam:  Constitutional: WDWN female, NAD AxOx3  Skin: warm and dry, no breakdowns noted  Chest: s1s2+, RRR, clear to auscultation bilaterally, no w/r/r    Abdomen: soft, nondistended, no guarding, no rebound, hypoactive bowel sounds,  Appropriate tenderness noted   Incisional site:  vertical incision clean and dry with Dermabond tape intact. Abdominal binder in place  Left Gluteal: dressing noted on lower aspect of left gluteal near fold, Dressing is clean and dry.   Extremities: no lower extremity edema or calf tenderness bilaterally; intermittent compression stockings in place     LABS:                        12.7   12.22 )-----------( 263      ( 24 Aug 2023 12:55 )             39.5     08-24    140  |  105  |  12  ----------------------------<  146<H>  3.8   |  21<L>  |  0.95    Ca    9.0      24 Aug 2023 12:55    TPro  9.1<H>  /  Alb  5.1<H>  /  TBili  0.4  /  DBili  x   /  AST  20  /  ALT  19  /  AlkPhos  76  08-24    CAPILLARY BLOOD GLUCOSE  POCT Blood Glucose.: 107 mg/dL (24 Aug 2023 06:39)      Assessment and Plan:  This 68y female, s/p examination under anesthesia, ex Lap, total abdominal hysterectomy, bilateral salpingooophorectomy, resection of peritoneal nodule, resection of left gluteal skin tag, cystoscopy, bilateral UCath placement (one removed intraop before arriving to PACU), for abnormal uterine bleeding and fibroids, Frozen->benign. Pt stable at present time    CV: hemodynamically stable. H/H stable, Lopressor 5mg IVP prn  PUL: adequate on RA,  GI: NPO at present, tolerating sips of water, pt ordered for clear liquid diet to start as tolerated  : Garcia in place with clear yellow urine noted in the bag, Garcia Catheter to remain in place until Saturday 8/26. 2nd UCath removed during this POC without incidence.   ID: afebrile, WBC stable, labs as above, ordered for AM labs  DVT prophylaxis: SQ Heparin intraop, which will continue in patient  Pain Management: controlled with IV Tylenol, Toradol, Oxycodone  ISS monitoring and CC diet  GHOS in am for co-management of patient care  continue IV Fluids LR@125cc/hr  d/w gyn onc team    Radha Waller, PAC  [id number] spectra

## 2023-08-24 NOTE — BRIEF OPERATIVE NOTE - NSICDXBRIEFPROCEDURE_GEN_ALL_CORE_FT
PROCEDURES:  TAHBSO (total abdominal hysterectomy and bilateral salpingo-oophorectomy) 24-Aug-2023 13:36:22  Wade Ly  Open diagnostic excision of peritoneum 24-Aug-2023 13:38:23  Wade Ly  Excision of skin tag of vulva 24-Aug-2023 13:40:48  Wade Ly

## 2023-08-24 NOTE — PATIENT PROFILE ADULT - FALL HARM RISK - HARM RISK INTERVENTIONS

## 2023-08-24 NOTE — BRIEF OPERATIVE NOTE - NSICDXBRIEFPREOP_GEN_ALL_CORE_FT
PRE-OP DIAGNOSIS:  Postmenopause bleeding 24-Aug-2023 13:40:59  Wade Ly  Fibroid uterus 24-Aug-2023 13:41:07  Wade Ly  Vulvar skin tag 24-Aug-2023 13:41:14  Wade Ly

## 2023-08-24 NOTE — BRIEF OPERATIVE NOTE - OPERATION/FINDINGS
EUA: vulva with left gluteal skin tag, nl v, cx, pm. Ut enlarged with no palp adnexal mass.   Ex Lap: no ascites, studding. Appy, SB, LB, oment, gutters/subdiapthr neg EUA: vulva with ~1cm left gluteal skin tag, nl v, cx, pm. Ut enlarged with no palp adnexal mass.   Ex Lap: no ascites, studding. Appy, SB, LB, oment, gutters/subdiapthr neg, liver edge smooth. Ut 16+ weeks with multiple myomas, adnexae nl.  ~1cm left PSW nodule on peritoneum between round and adnexa. FS: bgn EM polyp, myomas. BMB- neg. CC. V, SSF.

## 2023-08-25 DIAGNOSIS — Z29.9 ENCOUNTER FOR PROPHYLACTIC MEASURES, UNSPECIFIED: ICD-10-CM

## 2023-08-25 DIAGNOSIS — D72.829 ELEVATED WHITE BLOOD CELL COUNT, UNSPECIFIED: ICD-10-CM

## 2023-08-25 LAB
A1C WITH ESTIMATED AVERAGE GLUCOSE RESULT: 5.9 % — HIGH (ref 4–5.6)
ANION GAP SERPL CALC-SCNC: 13 MMOL/L — SIGNIFICANT CHANGE UP (ref 7–14)
BASOPHILS # BLD AUTO: 0.02 K/UL — SIGNIFICANT CHANGE UP (ref 0–0.2)
BASOPHILS NFR BLD AUTO: 0.2 % — SIGNIFICANT CHANGE UP (ref 0–2)
BUN SERPL-MCNC: 10 MG/DL — SIGNIFICANT CHANGE UP (ref 7–23)
CALCIUM SERPL-MCNC: 8.9 MG/DL — SIGNIFICANT CHANGE UP (ref 8.4–10.5)
CHLORIDE SERPL-SCNC: 104 MMOL/L — SIGNIFICANT CHANGE UP (ref 98–107)
CO2 SERPL-SCNC: 24 MMOL/L — SIGNIFICANT CHANGE UP (ref 22–31)
CREAT SERPL-MCNC: 0.95 MG/DL — SIGNIFICANT CHANGE UP (ref 0.5–1.3)
EGFR: 66 ML/MIN/1.73M2 — SIGNIFICANT CHANGE UP
EOSINOPHIL # BLD AUTO: 0.02 K/UL — SIGNIFICANT CHANGE UP (ref 0–0.5)
EOSINOPHIL NFR BLD AUTO: 0.2 % — SIGNIFICANT CHANGE UP (ref 0–6)
ESTIMATED AVERAGE GLUCOSE: 123 — SIGNIFICANT CHANGE UP
GLUCOSE BLDC GLUCOMTR-MCNC: 116 MG/DL — HIGH (ref 70–99)
GLUCOSE BLDC GLUCOMTR-MCNC: 85 MG/DL — SIGNIFICANT CHANGE UP (ref 70–99)
GLUCOSE BLDC GLUCOMTR-MCNC: 91 MG/DL — SIGNIFICANT CHANGE UP (ref 70–99)
GLUCOSE BLDC GLUCOMTR-MCNC: 96 MG/DL — SIGNIFICANT CHANGE UP (ref 70–99)
GLUCOSE SERPL-MCNC: 99 MG/DL — SIGNIFICANT CHANGE UP (ref 70–99)
HCT VFR BLD CALC: 35.4 % — SIGNIFICANT CHANGE UP (ref 34.5–45)
HGB BLD-MCNC: 11.6 G/DL — SIGNIFICANT CHANGE UP (ref 11.5–15.5)
IANC: 7.45 K/UL — HIGH (ref 1.8–7.4)
IMM GRANULOCYTES NFR BLD AUTO: 0.3 % — SIGNIFICANT CHANGE UP (ref 0–0.9)
LYMPHOCYTES # BLD AUTO: 2.39 K/UL — SIGNIFICANT CHANGE UP (ref 1–3.3)
LYMPHOCYTES # BLD AUTO: 22.3 % — SIGNIFICANT CHANGE UP (ref 13–44)
MCHC RBC-ENTMCNC: 29.1 PG — SIGNIFICANT CHANGE UP (ref 27–34)
MCHC RBC-ENTMCNC: 32.8 GM/DL — SIGNIFICANT CHANGE UP (ref 32–36)
MCV RBC AUTO: 88.7 FL — SIGNIFICANT CHANGE UP (ref 80–100)
MONOCYTES # BLD AUTO: 0.8 K/UL — SIGNIFICANT CHANGE UP (ref 0–0.9)
MONOCYTES NFR BLD AUTO: 7.5 % — SIGNIFICANT CHANGE UP (ref 2–14)
NEUTROPHILS # BLD AUTO: 7.45 K/UL — HIGH (ref 1.8–7.4)
NEUTROPHILS NFR BLD AUTO: 69.5 % — SIGNIFICANT CHANGE UP (ref 43–77)
NRBC # BLD: 0 /100 WBCS — SIGNIFICANT CHANGE UP (ref 0–0)
NRBC # FLD: 0 K/UL — SIGNIFICANT CHANGE UP (ref 0–0)
PLATELET # BLD AUTO: 244 K/UL — SIGNIFICANT CHANGE UP (ref 150–400)
POTASSIUM SERPL-MCNC: 3.4 MMOL/L — LOW (ref 3.5–5.3)
POTASSIUM SERPL-SCNC: 3.4 MMOL/L — LOW (ref 3.5–5.3)
RBC # BLD: 3.99 M/UL — SIGNIFICANT CHANGE UP (ref 3.8–5.2)
RBC # FLD: 12.8 % — SIGNIFICANT CHANGE UP (ref 10.3–14.5)
SODIUM SERPL-SCNC: 141 MMOL/L — SIGNIFICANT CHANGE UP (ref 135–145)
WBC # BLD: 10.71 K/UL — HIGH (ref 3.8–10.5)
WBC # FLD AUTO: 10.71 K/UL — HIGH (ref 3.8–10.5)

## 2023-08-25 PROCEDURE — 99223 1ST HOSP IP/OBS HIGH 75: CPT

## 2023-08-25 RX ORDER — ENOXAPARIN SODIUM 100 MG/ML
40 INJECTION SUBCUTANEOUS EVERY 24 HOURS
Refills: 0 | Status: DISCONTINUED | OUTPATIENT
Start: 2023-08-25 | End: 2023-08-27

## 2023-08-25 RX ORDER — ACETAMINOPHEN 500 MG
975 TABLET ORAL EVERY 6 HOURS
Refills: 0 | Status: DISCONTINUED | OUTPATIENT
Start: 2023-08-25 | End: 2023-08-27

## 2023-08-25 RX ORDER — AMLODIPINE BESYLATE 2.5 MG/1
10 TABLET ORAL DAILY
Refills: 0 | Status: DISCONTINUED | OUTPATIENT
Start: 2023-08-25 | End: 2023-08-27

## 2023-08-25 RX ORDER — IBUPROFEN 200 MG
600 TABLET ORAL EVERY 6 HOURS
Refills: 0 | Status: DISCONTINUED | OUTPATIENT
Start: 2023-08-25 | End: 2023-08-27

## 2023-08-25 RX ORDER — OXYCODONE HYDROCHLORIDE 5 MG/1
2.5 TABLET ORAL EVERY 4 HOURS
Refills: 0 | Status: DISCONTINUED | OUTPATIENT
Start: 2023-08-25 | End: 2023-08-27

## 2023-08-25 RX ORDER — OXYCODONE HYDROCHLORIDE 5 MG/1
5 TABLET ORAL EVERY 4 HOURS
Refills: 0 | Status: DISCONTINUED | OUTPATIENT
Start: 2023-08-25 | End: 2023-08-27

## 2023-08-25 RX ORDER — ATORVASTATIN CALCIUM 80 MG/1
20 TABLET, FILM COATED ORAL AT BEDTIME
Refills: 0 | Status: DISCONTINUED | OUTPATIENT
Start: 2023-08-25 | End: 2023-08-27

## 2023-08-25 RX ADMIN — Medication 30 MILLIGRAM(S): at 08:39

## 2023-08-25 RX ADMIN — Medication 975 MILLIGRAM(S): at 23:48

## 2023-08-25 RX ADMIN — SIMETHICONE 80 MILLIGRAM(S): 80 TABLET, CHEWABLE ORAL at 19:41

## 2023-08-25 RX ADMIN — SODIUM CHLORIDE 75 MILLILITER(S): 9 INJECTION, SOLUTION INTRAVENOUS at 19:41

## 2023-08-25 RX ADMIN — Medication 30 MILLIGRAM(S): at 01:46

## 2023-08-25 RX ADMIN — ATORVASTATIN CALCIUM 20 MILLIGRAM(S): 80 TABLET, FILM COATED ORAL at 22:06

## 2023-08-25 RX ADMIN — Medication 30 MILLIGRAM(S): at 08:50

## 2023-08-25 RX ADMIN — Medication 600 MILLIGRAM(S): at 17:33

## 2023-08-25 RX ADMIN — OXYCODONE HYDROCHLORIDE 5 MILLIGRAM(S): 5 TABLET ORAL at 20:41

## 2023-08-25 RX ADMIN — ENOXAPARIN SODIUM 40 MILLIGRAM(S): 100 INJECTION SUBCUTANEOUS at 17:34

## 2023-08-25 RX ADMIN — OXYCODONE HYDROCHLORIDE 5 MILLIGRAM(S): 5 TABLET ORAL at 19:41

## 2023-08-25 RX ADMIN — Medication 975 MILLIGRAM(S): at 17:34

## 2023-08-25 RX ADMIN — HEPARIN SODIUM 5000 UNIT(S): 5000 INJECTION INTRAVENOUS; SUBCUTANEOUS at 06:03

## 2023-08-25 RX ADMIN — SIMETHICONE 80 MILLIGRAM(S): 80 TABLET, CHEWABLE ORAL at 23:48

## 2023-08-25 RX ADMIN — Medication 30 MILLIGRAM(S): at 02:16

## 2023-08-25 RX ADMIN — SIMETHICONE 80 MILLIGRAM(S): 80 TABLET, CHEWABLE ORAL at 08:39

## 2023-08-25 RX ADMIN — SODIUM CHLORIDE 125 MILLILITER(S): 9 INJECTION, SOLUTION INTRAVENOUS at 06:03

## 2023-08-25 RX ADMIN — Medication 600 MILLIGRAM(S): at 18:03

## 2023-08-25 RX ADMIN — SODIUM CHLORIDE 75 MILLILITER(S): 9 INJECTION, SOLUTION INTRAVENOUS at 15:23

## 2023-08-25 RX ADMIN — Medication 600 MILLIGRAM(S): at 23:48

## 2023-08-25 RX ADMIN — SENNA PLUS 1 TABLET(S): 8.6 TABLET ORAL at 22:06

## 2023-08-25 RX ADMIN — ONDANSETRON 4 MILLIGRAM(S): 8 TABLET, FILM COATED ORAL at 16:26

## 2023-08-25 RX ADMIN — Medication 975 MILLIGRAM(S): at 18:03

## 2023-08-25 NOTE — CONSULT NOTE ADULT - PROBLEM SELECTOR RECOMMENDATION 9
-Pain well controlled; continue management and pain control per gyn-onc recs with tylenol q6hrs, oxycodone IR prn, motrin q6hrs  -c/w bowel regimen  -c/w incentive spirometer use  -c/w PT  -plan for TOV on Sat  -rest of plan gyn-onc team

## 2023-08-25 NOTE — PROGRESS NOTE ADULT - ATTENDING COMMENTS
69yo POD#1 s/p Total Abdominal Hysterectomy. Bilateral Salpingo-oophorectomy, Removal of Peritoneal nodule, resection of left gluteal skin tag, cysto, b/l ucaths () for fibroids (IOFS benign). Patient was stable overnight and progressing well postoperatively.     -AVSS, labs reviewed. Afebrile, appropriate postop leukocytosis. Replete lytes as indicated.  -Hct stable and on lovenox ppx.    -Pain well controlled with current pain medication regimen.  -Tolerating CLD, to advance as tolerated. Awaiting flatus. Abdominal binder in situ. Site of skin tag incision c/d/i and healing well.   -Garcia in situ, UOP adequate and to consider d/c tomorrow.   -Ambulation and IS encouraged.  -Plan for continued inpatient management   -All questions answered to patients satisfaction.    Rukhsana Greene MD      Division of Gynecologic Oncology    Department of Obstetrics and Gynecology  [facility]    [facility]

## 2023-08-25 NOTE — CONSULT NOTE ADULT - ASSESSMENT
68F h/o HTN, HLD, DM2 p/w preop diagnosis of Uterine fibroids now s/p total abdominal hysterectomy, bilateral salpingo-oophorectomy, removal of peritoneal nodule, resection of left gluteal skin tag, cysto, b/l ucaths (ebl 600) for fibroids (frozen=benign) on 8/24/23.

## 2023-08-25 NOTE — CHART NOTE - NSCHARTNOTEFT_GEN_A_CORE
Patient evaluated at bedside this afternoon. Denies complaints. + OOB, Tolerating diet.    Objective  T(C): 36.7 (08-25-23 @ 14:20), Max: 37.2 (08-25-23 @ 10:09)  HR: 78 (08-25-23 @ 14:20) (78 - 85)  BP: 126/71 (08-25-23 @ 14:20) (124/65 - 126/71)  RR: 15 (08-25-23 @ 14:20) (15 - 18)  SpO2: 99% (08-25-23 @ 14:20) (97% - 99%)  Wt(kg): --    08-24 @ 07:01  -  08-25 @ 07:00  --------------------------------------------------------  IN: 1245 mL / OUT: 2025 mL / NET: -780 mL    08-25 @ 07:01  -  08-25 @ 15:41  --------------------------------------------------------  IN: 1000 mL / OUT: 1301 mL / NET: -301 mL        Gen: NAD  Abd: Soft NT  Ext: NT BL    acetaminophen     Tablet .. 975 milliGRAM(s) Oral every 6 hours  amLODIPine   Tablet 10 milliGRAM(s) Oral daily  atorvastatin 20 milliGRAM(s) Oral at bedtime  dextrose 5%. 1000 milliLiter(s) IV Continuous <Continuous>  dextrose 5%. 1000 milliLiter(s) IV Continuous <Continuous>  dextrose 50% Injectable 25 Gram(s) IV Push once  dextrose 50% Injectable 12.5 Gram(s) IV Push once  dextrose 50% Injectable 25 Gram(s) IV Push once  dextrose Oral Gel 15 Gram(s) Oral once PRN  enoxaparin Injectable 40 milliGRAM(s) SubCutaneous every 24 hours  glucagon  Injectable 1 milliGRAM(s) IntraMuscular once  ibuprofen  Tablet. 600 milliGRAM(s) Oral every 6 hours  insulin lispro (ADMELOG) corrective regimen sliding scale   SubCutaneous three times a day before meals  insulin lispro (ADMELOG) corrective regimen sliding scale   SubCutaneous at bedtime  lactated ringers. 1000 milliLiter(s) IV Continuous <Continuous>  metoprolol tartrate Injectable 5 milliGRAM(s) IV Push every 6 hours PRN  ondansetron Injectable 4 milliGRAM(s) IV Push every 6 hours PRN  oxyCODONE    IR 5 milliGRAM(s) Oral every 4 hours PRN  oxyCODONE    IR 2.5 milliGRAM(s) Oral every 4 hours PRN  senna 1 Tablet(s) Oral at bedtime  simethicone 80 milliGRAM(s) Chew five times a day    Hospitalist Recommendations:  · Assessment	  68F h/o HTN, HLD, DM2 p/w preop diagnosis of Uterine fibroids now s/p total abdominal hysterectomy, bilateral salpingo-oophorectomy, removal of peritoneal nodule, resection of left gluteal skin tag, cysto, b/l ucaths (ebl 600) for fibroids (frozen=benign) on 8/24/23.       Problem/Recommendation - 1:  ·  Problem: Uterine leiomyoma.   ·  Recommendation: -Pain well controlled; continue management and pain control per gyn-onc recs with tylenol q6hrs, oxycodone IR prn, motrin q6hrs  -c/w bowel regimen  -c/w incentive spirometer use  -c/w PT  -plan for TOV on Sat  -rest of plan gyn-onc team.     Problem/Recommendation - 2:  ·  Problem: Leukocytosis.   ·  Recommendation: -likely reactive secondary to post-op changes; patient with no signs of active infection and hemodynamically stable; will continue to monitor closely.     Problem/Recommendation - 3:  ·  Problem: Hypertension.   ·  Recommendation: -BP well controlled; c/w current BP regimen.     Problem/Recommendation - 4:  ·  Problem: Type 2 diabetes mellitus.   ·  Recommendation: -f/u HgbA1c -- added to morning labs; if insufficient quantity please order for AM labs  -FS well controlled. Continue with ISS for now and continue to monitor FS closely.  -holding metformin and trulicity.     Problem/Recommendation - 5:  ·  Problem: Need for prophylactic measure.   ·  Recommendation: -c/w lovenox subq daily.      Electronic Signatures:  Rajani Gay)  (Signed 25-Aug-2023 14:23)  	Authored: Consult Note, Referral/Consultation, Subjective and Objective, Assessment and Recommendation      - Transition from HSQ to Lovenox 40mg q24  - Saline Locked  - DC'd IV Pain control, transitioned to PO Tylenol/Motrin q6 and Oxycodone PRN  - Tolerating PO Diet POD#2 PM-Chart Note    Patient evaluated at bedside this afternoon. Denies complaints. + OOB, Tolerating diet. Having flatus.    Objective  T(C): 36.7 (08-25-23 @ 14:20), Max: 37.2 (08-25-23 @ 10:09)  HR: 78 (08-25-23 @ 14:20) (78 - 85)  BP: 126/71 (08-25-23 @ 14:20) (124/65 - 126/71)  RR: 15 (08-25-23 @ 14:20) (15 - 18)  SpO2: 99% (08-25-23 @ 14:20) (97% - 99%)  Wt(kg): --    08-24 @ 07:01  -  08-25 @ 07:00  --------------------------------------------------------  IN: 1245 mL / OUT: 2025 mL / NET: -780 mL    08-25 @ 07:01  -  08-25 @ 15:41  --------------------------------------------------------  IN: 1000 mL / OUT: 1301 mL / NET: -301 mL        Gen: NAD  Abd: Soft NT  Ext: NT BL    acetaminophen     Tablet .. 975 milliGRAM(s) Oral every 6 hours  amLODIPine   Tablet 10 milliGRAM(s) Oral daily  atorvastatin 20 milliGRAM(s) Oral at bedtime  dextrose 5%. 1000 milliLiter(s) IV Continuous <Continuous>  dextrose 5%. 1000 milliLiter(s) IV Continuous <Continuous>  dextrose 50% Injectable 25 Gram(s) IV Push once  dextrose 50% Injectable 12.5 Gram(s) IV Push once  dextrose 50% Injectable 25 Gram(s) IV Push once  dextrose Oral Gel 15 Gram(s) Oral once PRN  enoxaparin Injectable 40 milliGRAM(s) SubCutaneous every 24 hours  glucagon  Injectable 1 milliGRAM(s) IntraMuscular once  ibuprofen  Tablet. 600 milliGRAM(s) Oral every 6 hours  insulin lispro (ADMELOG) corrective regimen sliding scale   SubCutaneous three times a day before meals  insulin lispro (ADMELOG) corrective regimen sliding scale   SubCutaneous at bedtime  lactated ringers. 1000 milliLiter(s) IV Continuous <Continuous>  metoprolol tartrate Injectable 5 milliGRAM(s) IV Push every 6 hours PRN  ondansetron Injectable 4 milliGRAM(s) IV Push every 6 hours PRN  oxyCODONE    IR 5 milliGRAM(s) Oral every 4 hours PRN  oxyCODONE    IR 2.5 milliGRAM(s) Oral every 4 hours PRN  senna 1 Tablet(s) Oral at bedtime  simethicone 80 milliGRAM(s) Chew five times a day    Hospitalist Recommendations:  · Assessment	  68F h/o HTN, HLD, DM2 p/w preop diagnosis of Uterine fibroids now s/p total abdominal hysterectomy, bilateral salpingo-oophorectomy, removal of peritoneal nodule, resection of left gluteal skin tag, cysto, b/l ucaths (ebl 600) for fibroids (frozen=benign) on 8/24/23.       Problem/Recommendation - 1:  ·  Problem: Uterine leiomyoma.   ·  Recommendation: -Pain well controlled; continue management and pain control per gyn-onc recs with tylenol q6hrs, oxycodone IR prn, motrin q6hrs  -c/w bowel regimen  -c/w incentive spirometer use  -c/w PT  -plan for TOV on Sat  -rest of plan gyn-onc team.     Problem/Recommendation - 2:  ·  Problem: Leukocytosis.   ·  Recommendation: -likely reactive secondary to post-op changes; patient with no signs of active infection and hemodynamically stable; will continue to monitor closely.     Problem/Recommendation - 3:  ·  Problem: Hypertension.   ·  Recommendation: -BP well controlled; c/w current BP regimen.     Problem/Recommendation - 4:  ·  Problem: Type 2 diabetes mellitus.   ·  Recommendation: -f/u HgbA1c -- added to morning labs; if insufficient quantity please order for AM labs  -FS well controlled. Continue with ISS for now and continue to monitor FS closely.  -holding metformin and trulicity.     Problem/Recommendation - 5:  ·  Problem: Need for prophylactic measure.   ·  Recommendation: -c/w lovenox subq daily.      Electronic Signatures:  Idriss, Rajani Z (MD)  (Signed 25-Aug-2023 14:23)  	Authored: Consult Note, Referral/Consultation, Subjective and Objective, Assessment and Recommendation    A/P: 67yo F POD#2 with a h/o HTN, HLD, DM2 and uterine fibroids who had an exploratory laparotomy, total abdominal hysterectomy, bilateral salpingooophorectomy, resection of peritoneal module, resection of left gluteal skin tag, cystoscopy, b/l ucaths, and b/l TAP blocks with Dr. Ly. Frozen sections were benign.     - Transitioned from HSQ to Lovenox 40mg q24  - Saline Locked  - DC'd IV Pain control, transitioned to PO Tylenol/Motrin q6 and Oxycodone PRN  - Tolerating PO Diet POD#1 PM-Chart Note    Patient evaluated at bedside this afternoon. Denies complaints. + OOB, Tolerating diet. Having flatus.    Objective  T(C): 36.7 (08-25-23 @ 14:20), Max: 37.2 (08-25-23 @ 10:09)  HR: 78 (08-25-23 @ 14:20) (78 - 85)  BP: 126/71 (08-25-23 @ 14:20) (124/65 - 126/71)  RR: 15 (08-25-23 @ 14:20) (15 - 18)  SpO2: 99% (08-25-23 @ 14:20) (97% - 99%)  Wt(kg): --    08-24 @ 07:01  -  08-25 @ 07:00  --------------------------------------------------------  IN: 1245 mL / OUT: 2025 mL / NET: -780 mL    08-25 @ 07:01  -  08-25 @ 15:41  --------------------------------------------------------  IN: 1000 mL / OUT: 1301 mL / NET: -301 mL      acetaminophen     Tablet .. 975 milliGRAM(s) Oral every 6 hours  amLODIPine   Tablet 10 milliGRAM(s) Oral daily  atorvastatin 20 milliGRAM(s) Oral at bedtime  dextrose 5%. 1000 milliLiter(s) IV Continuous <Continuous>  dextrose 5%. 1000 milliLiter(s) IV Continuous <Continuous>  dextrose 50% Injectable 25 Gram(s) IV Push once  dextrose 50% Injectable 12.5 Gram(s) IV Push once  dextrose 50% Injectable 25 Gram(s) IV Push once  dextrose Oral Gel 15 Gram(s) Oral once PRN  enoxaparin Injectable 40 milliGRAM(s) SubCutaneous every 24 hours  glucagon  Injectable 1 milliGRAM(s) IntraMuscular once  ibuprofen  Tablet. 600 milliGRAM(s) Oral every 6 hours  insulin lispro (ADMELOG) corrective regimen sliding scale   SubCutaneous three times a day before meals  insulin lispro (ADMELOG) corrective regimen sliding scale   SubCutaneous at bedtime  lactated ringers. 1000 milliLiter(s) IV Continuous <Continuous>  metoprolol tartrate Injectable 5 milliGRAM(s) IV Push every 6 hours PRN  ondansetron Injectable 4 milliGRAM(s) IV Push every 6 hours PRN  oxyCODONE    IR 5 milliGRAM(s) Oral every 4 hours PRN  oxyCODONE    IR 2.5 milliGRAM(s) Oral every 4 hours PRN  senna 1 Tablet(s) Oral at bedtime  simethicone 80 milliGRAM(s) Chew five times a day    A/P: 67yo F POD#1 with a h/o HTN, HLD, DM2 and uterine fibroids who had an exploratory laparotomy, total abdominal hysterectomy, bilateral salpingooophorectomy, resection of peritoneal module, resection of left gluteal skin tag, cystoscopy, b/l ucaths, and b/l TAP blocks with Dr. Ly. Frozen sections were benign. Pt meeting post-op milestones appropriately and recovering well.     - Transitioned from HSQ to Lovenox 40mg q24  - Saline Locked  - DC'd IV Pain control, transitioned to PO Tylenol/Motrin q6 and Oxycodone PRN  - Tolerating PO Diet  - GHOS recs: continue current BP regimen, ISS, monitor FS    [facility]imm[facility] PGY2

## 2023-08-25 NOTE — CONSULT NOTE ADULT - SUBJECTIVE AND OBJECTIVE BOX
CHIEF COMPLAINT: Patient is a 68y old  Female who presents with a chief complaint of for hysterectomy (25 Jul 2023 11:16)    HPI: 68F h/o HTN, HLD, DM2 p/w preop diagnosis of Uterine fibroids scheduled for Exploratory Laparotomy, total abdominal hysterectomy, bilateral salpingo oophorectomy, possible staging, Cysto/ with Dr Ly, (25 Jul 2023 11:16). Patient tolerated procedure well. Denies any F/C/N/V, CP or SOB. Passing flatus and tolerating regular diet. Worked well with PT.     Allergies: No Known Allergies    HOME MEDICATIONS: [x] Reviewed    MEDICATIONS  (STANDING):  acetaminophen     Tablet .. 975 milliGRAM(s) Oral every 6 hours  amLODIPine   Tablet 10 milliGRAM(s) Oral daily  atorvastatin 20 milliGRAM(s) Oral at bedtime  dextrose 5%. 1000 milliLiter(s) (100 mL/Hr) IV Continuous <Continuous>  dextrose 5%. 1000 milliLiter(s) (50 mL/Hr) IV Continuous <Continuous>  dextrose 50% Injectable 25 Gram(s) IV Push once  dextrose 50% Injectable 25 Gram(s) IV Push once  dextrose 50% Injectable 12.5 Gram(s) IV Push once  enoxaparin Injectable 40 milliGRAM(s) SubCutaneous every 24 hours  glucagon  Injectable 1 milliGRAM(s) IntraMuscular once  ibuprofen  Tablet. 600 milliGRAM(s) Oral every 6 hours  insulin lispro (ADMELOG) corrective regimen sliding scale   SubCutaneous three times a day before meals  insulin lispro (ADMELOG) corrective regimen sliding scale   SubCutaneous at bedtime  lactated ringers. 1000 milliLiter(s) (125 mL/Hr) IV Continuous <Continuous>  senna 1 Tablet(s) Oral at bedtime  simethicone 80 milliGRAM(s) Chew five times a day    MEDICATIONS  (PRN):  dextrose Oral Gel 15 Gram(s) Oral once PRN Blood Glucose LESS THAN 70 milliGRAM(s)/deciliter  metoprolol tartrate Injectable 5 milliGRAM(s) IV Push every 6 hours PRN BP >160/110  ondansetron Injectable 4 milliGRAM(s) IV Push every 6 hours PRN Nausea and/or Vomiting  oxyCODONE    IR 5 milliGRAM(s) Oral every 4 hours PRN Severe Pain (7 - 10)  oxyCODONE    IR 2.5 milliGRAM(s) Oral every 4 hours PRN Moderate Pain (4 - 6)    PAST MEDICAL & SURGICAL HISTORY:  Hypertension  Hyperlipidemia  Diabetes  Glaucoma  S/P endoscopy  S/P breast biopsy  S/P cataract surgery  [x ] Reviewed     SOCIAL HISTORY:  Substance Use History:  · Substance Use	caffeine  denies  · Caffeine Type	tea  · Caffeine Amount/Frequency	1-2 cups/cans per day    Alcohol Use History:  · Have you ever consumed alcohol	never    Tobacco Usage:  · Tobacco Usage: Never smoker      FAMILY HISTORY:  FHx: lung cancer (Sibling)  [x] No pertinent family history in first degree relatives     REVIEW OF SYSTEMS:  [x] All other ROS negative  [  ] Unable to obtain due to poor mental status    Vital Signs Last 24 Hrs  T(C): 37.2 (25 Aug 2023 10:09), Max: 37.2 (25 Aug 2023 10:09)  T(F): 99 (25 Aug 2023 10:09), Max: 99 (25 Aug 2023 10:09)  HR: 85 (25 Aug 2023 06:09) (85 - 100)  BP: 124/65 (25 Aug 2023 06:09) (124/65 - 141/81)  BP(mean): 96 (24 Aug 2023 15:00) (84 - 96)  RR: 18 (25 Aug 2023 10:09) (15 - 20)  SpO2: 98% (25 Aug 2023 10:09) (94% - 98%)    Parameters below as of 25 Aug 2023 10:09  Patient On (Oxygen Delivery Method): room air    PHYSICAL EXAM:  GENERAL: NAD  HEAD:  Atraumatic, Normocephalic  EYES: EOMI, PERRLA, conjunctiva and sclera clear  ENMT: Moist mucous membranes  NECK: Supple, No JVD  RESPIRATORY: Clear to auscultation bilaterally; No rales, rhonchi, wheezing, or rubs  CARDIOVASCULAR: Regular rate and rhythm; No murmurs, rubs, or gallops  GASTROINTESTINAL: Soft, appropriately tender, Nondistended; Bowel sounds present  GENITOURINARY: +frazier  EXTREMITIES:  2+ Peripheral Pulses, No clubbing, cyanosis, or edema  NERVOUS SYSTEM:  Alert & Oriented X3; Moving all 4 extremities; No gross sensory deficits  SKIN: Dressing C/D/I; Midline vertical with dermabond prineo covering    LABS:                        11.6   10.71 )-----------( 244      ( 25 Aug 2023 05:30 )             35.4     Hemoglobin: 11.6 g/dL (08-25 @ 05:30)  Hemoglobin: 12.7 g/dL (08-24 @ 12:55)  Hemoglobin: 14.4 g/dL (08-24 @ 06:15)    08-25    141  |  104  |  10  ----------------------------<  99  3.4<L>   |  24  |  0.95    Ca    8.9      25 Aug 2023 05:30    CAPILLARY BLOOD GLUCOSE  POCT Blood Glucose.: 91 mg/dL (25 Aug 2023 12:37)  POCT Blood Glucose.: 85 mg/dL (25 Aug 2023 07:54)  POCT Blood Glucose.: 118 mg/dL (24 Aug 2023 21:15)  POCT Blood Glucose.: 117 mg/dL (24 Aug 2023 17:04)  POCT Blood Glucose.: 128 mg/dL (24 Aug 2023 15:41)    TPro  9.1<H>  /  Alb  5.1<H>  /  TBili  0.4  /  DBili  x   /  AST  20  /  ALT  19  /  AlkPhos  76  08-24    Urinalysis Basic - ( 25 Aug 2023 05:30 )  Color: x / Appearance: x / SG: x / pH: x  Gluc: 99 mg/dL / Ketone: x  / Bili: x / Urobili: x   Blood: x / Protein: x / Nitrite: x   Leuk Esterase: x / RBC: x / WBC x   Sq Epi: x / Non Sq Epi: x / Bacteria: x    RADIOLOGY & ADDITIONAL STUDIES:  Imaging:   Personally Reviewed:  [x] YES   < from: US Transvaginal (08.14.23 @ 16:22) >  IMPRESSION:  Enlarged multi fibroid uterus largely obscuring the endometrium.  Follow-up MRI would provide better visualization.  < end of copied text >              [ ] Consultant(s) Notes Reviewed  [x] Care Discussed with Consultants/Other Providers: Ortho PA - discussed post-op management CHIEF COMPLAINT: Patient is a 68y old  Female who presents with a chief complaint of for hysterectomy (25 Jul 2023 11:16)    HPI: 68F h/o HTN, HLD, DM2 p/w preop diagnosis of Uterine fibroids scheduled for Exploratory Laparotomy, total abdominal hysterectomy, bilateral salpingo oophorectomy, possible staging, Cysto/ with Dr Ly, (25 Jul 2023 11:16). Patient tolerated procedure well. Denies any F/C/N/V, CP or SOB. Passing flatus and tolerating regular diet. Worked well with PT.     Allergies: No Known Allergies    HOME MEDICATIONS: [x] Reviewed    MEDICATIONS  (STANDING):  acetaminophen     Tablet .. 975 milliGRAM(s) Oral every 6 hours  amLODIPine   Tablet 10 milliGRAM(s) Oral daily  atorvastatin 20 milliGRAM(s) Oral at bedtime  dextrose 5%. 1000 milliLiter(s) (100 mL/Hr) IV Continuous <Continuous>  dextrose 5%. 1000 milliLiter(s) (50 mL/Hr) IV Continuous <Continuous>  dextrose 50% Injectable 25 Gram(s) IV Push once  dextrose 50% Injectable 25 Gram(s) IV Push once  dextrose 50% Injectable 12.5 Gram(s) IV Push once  enoxaparin Injectable 40 milliGRAM(s) SubCutaneous every 24 hours  glucagon  Injectable 1 milliGRAM(s) IntraMuscular once  ibuprofen  Tablet. 600 milliGRAM(s) Oral every 6 hours  insulin lispro (ADMELOG) corrective regimen sliding scale   SubCutaneous three times a day before meals  insulin lispro (ADMELOG) corrective regimen sliding scale   SubCutaneous at bedtime  lactated ringers. 1000 milliLiter(s) (125 mL/Hr) IV Continuous <Continuous>  senna 1 Tablet(s) Oral at bedtime  simethicone 80 milliGRAM(s) Chew five times a day    MEDICATIONS  (PRN):  dextrose Oral Gel 15 Gram(s) Oral once PRN Blood Glucose LESS THAN 70 milliGRAM(s)/deciliter  metoprolol tartrate Injectable 5 milliGRAM(s) IV Push every 6 hours PRN BP >160/110  ondansetron Injectable 4 milliGRAM(s) IV Push every 6 hours PRN Nausea and/or Vomiting  oxyCODONE    IR 5 milliGRAM(s) Oral every 4 hours PRN Severe Pain (7 - 10)  oxyCODONE    IR 2.5 milliGRAM(s) Oral every 4 hours PRN Moderate Pain (4 - 6)    PAST MEDICAL & SURGICAL HISTORY:  Hypertension  Hyperlipidemia  Diabetes  Glaucoma  S/P endoscopy  S/P breast biopsy  S/P cataract surgery  [x ] Reviewed     SOCIAL HISTORY:  Substance Use History:  · Substance Use	caffeine  denies  · Caffeine Type	tea  · Caffeine Amount/Frequency	1-2 cups/cans per day    Alcohol Use History:  · Have you ever consumed alcohol	never    Tobacco Usage:  · Tobacco Usage: Never smoker      FAMILY HISTORY:  FHx: lung cancer (Sibling)  [x] No pertinent family history in first degree relatives     REVIEW OF SYSTEMS:  [x] All other ROS negative  [  ] Unable to obtain due to poor mental status    Vital Signs Last 24 Hrs  T(C): 37.2 (25 Aug 2023 10:09), Max: 37.2 (25 Aug 2023 10:09)  T(F): 99 (25 Aug 2023 10:09), Max: 99 (25 Aug 2023 10:09)  HR: 85 (25 Aug 2023 06:09) (85 - 100)  BP: 124/65 (25 Aug 2023 06:09) (124/65 - 141/81)  BP(mean): 96 (24 Aug 2023 15:00) (84 - 96)  RR: 18 (25 Aug 2023 10:09) (15 - 20)  SpO2: 98% (25 Aug 2023 10:09) (94% - 98%)    Parameters below as of 25 Aug 2023 10:09  Patient On (Oxygen Delivery Method): room air    PHYSICAL EXAM:  GENERAL: NAD  HEAD:  Atraumatic, Normocephalic  EYES: EOMI, PERRLA, conjunctiva and sclera clear  ENMT: Moist mucous membranes  NECK: Supple, No JVD  RESPIRATORY: Clear to auscultation bilaterally; No rales, rhonchi, wheezing, or rubs  CARDIOVASCULAR: Regular rate and rhythm; No murmurs, rubs, or gallops  GASTROINTESTINAL: Soft, appropriately tender, Nondistended; Bowel sounds present  GENITOURINARY: +frazier  EXTREMITIES:  2+ Peripheral Pulses, No clubbing, cyanosis, or edema  NERVOUS SYSTEM:  Alert & Oriented X3; Moving all 4 extremities; No gross sensory deficits  SKIN: Dressing C/D/I; Midline vertical with dermabond prineo covering    LABS:                        11.6   10.71 )-----------( 244      ( 25 Aug 2023 05:30 )             35.4     Hemoglobin: 11.6 g/dL (08-25 @ 05:30)  Hemoglobin: 12.7 g/dL (08-24 @ 12:55)  Hemoglobin: 14.4 g/dL (08-24 @ 06:15)    08-25    141  |  104  |  10  ----------------------------<  99  3.4<L>   |  24  |  0.95    Ca    8.9      25 Aug 2023 05:30    CAPILLARY BLOOD GLUCOSE  POCT Blood Glucose.: 91 mg/dL (25 Aug 2023 12:37)  POCT Blood Glucose.: 85 mg/dL (25 Aug 2023 07:54)  POCT Blood Glucose.: 118 mg/dL (24 Aug 2023 21:15)  POCT Blood Glucose.: 117 mg/dL (24 Aug 2023 17:04)  POCT Blood Glucose.: 128 mg/dL (24 Aug 2023 15:41)    TPro  9.1<H>  /  Alb  5.1<H>  /  TBili  0.4  /  DBili  x   /  AST  20  /  ALT  19  /  AlkPhos  76  08-24    Urinalysis Basic - ( 25 Aug 2023 05:30 )  Color: x / Appearance: x / SG: x / pH: x  Gluc: 99 mg/dL / Ketone: x  / Bili: x / Urobili: x   Blood: x / Protein: x / Nitrite: x   Leuk Esterase: x / RBC: x / WBC x   Sq Epi: x / Non Sq Epi: x / Bacteria: x    RADIOLOGY & ADDITIONAL STUDIES:  Imaging:   Personally Reviewed:  [x] YES   < from: US Transvaginal (08.14.23 @ 16:22) >  IMPRESSION:  Enlarged multi fibroid uterus largely obscuring the endometrium.  Follow-up MRI would provide better visualization.  < end of copied text >              [ ] Consultant(s) Notes Reviewed  [x] Care Discussed with Consultants/Other Providers: Gyn-Onc PA - discussed post-op management

## 2023-08-25 NOTE — PROGRESS NOTE ADULT - ASSESSMENT
A/P:     Neuro: Pain well controlled on current regimen   CV: Hemodynamically stable, H/H stable   Pulm: O2 sat WNL on RA, Increase ambulation, encourage incentive spirometry use  GI: Tolerating regular diet  : Voiding spontaneously  Heme: DVT ppx: Lovenox, SCDs while in bed  ID: Afebrile, No signs of infection  FEN: LR@125, Replete electrolytes PRN   Dispo:     Nanci Daly, PGY-4 A/P: 67yo POD#1 s/p Total Abdominal Hysterectomy. Bilateral Salpingo-oophorectomy, Removal of Peritoneal nodule, resection of leftgluteal skin tag, cysto, b/l ucaths that was uncomplicated. Patient was stable overnight and progressing well postoperatively.     Neuro: Pain well controlled on current regimen   CV: Hemodynamically stable, f/u AM CBC  - H/o HTN, c/w Lopressor PRN  Pulm: O2 sat WNL on RA, Increase ambulation, encourage incentive spirometry use  GI: Tolerating clear diet. Zofran, senna, simethicone PRN  : Garcia until Sat  Heme: DVT ppx: HSQ, SCDs while in bed  ID: Afebrile, No signs of infection  Endo: h/o T2DM, c/w ISS  FEN: LR@125, Replete electrolytes PRN   Dispo: continued inpt care    Nanci Daly, PGY-4 A/P: 69yo POD#1 s/p Total Abdominal Hysterectomy. Bilateral Salpingo-oophorectomy, Removal of Peritoneal nodule, resection of left gluteal skin tag, cysto, b/l ucaths (ebl 600) that was uncomplicated. Patient was stable overnight and progressing well postoperatively.     Neuro: Pain well controlled on IV Tylenol, Toradol, Dilaudid PRN  - s/p b/l TAP blocks intraop  CV: Hemodynamically stable, f/u AM CBC  - H/o HTN, c/w Lopressor PRN  Pulm: O2 sat WNL on RA, Increase ambulation, encourage incentive spirometry use  GI: Tolerating clear diet. Zofran, senna, simethicone PRN  : Garcia until Sat. UOP adequate o/n  - s/p b/l ucaths  Heme: DVT ppx: HSQ, SCDs while in bed  ID: Afebrile, No signs of infection  Endo: h/o T2DM, c/w ISS  FEN: LR@125, Replete electrolytes PRN   Dispo: continued inpt care    Nanci Daly, PGY-4 A/P: 69yo POD#1 s/p Total Abdominal Hysterectomy. Bilateral Salpingo-oophorectomy, Removal of Peritoneal nodule, resection of left gluteal skin tag, cysto, b/l ucaths (ebl 600) that was uncomplicated. Patient was stable overnight and progressing well postoperatively.     Neuro: Pain well controlled on IV Tylenol, Toradol, Dilaudid PRN  - s/p b/l TAP blocks intraop  CV: Hemodynamically stable, f/u AM CBC  - H/o HTN, c/w Lopressor PRN  Pulm: O2 sat WNL on RA, Increase ambulation, encourage incentive spirometry use  GI: Tolerating clear diet. Zofran, senna, simethicone PRN  : Garcia until Sat. UOP adequate o/n  - s/p b/l ucaths  Heme: DVT ppx: HSQ, SCDs while in bed  ID: Afebrile, No signs of infection  Endo: h/o T2DM, c/w ISS  FEN: LR@125, Replete electrolytes PRN   Dispo: continued inpt care    Nanci Daly, PGY-4  Ezekiel Mercado, MS3 A/P: 67yo POD#1 s/p Total Abdominal Hysterectomy. Bilateral Salpingo-oophorectomy, Removal of Peritoneal nodule, resection of left gluteal skin tag, cysto, b/l ucaths (ebl 600) for fibroids (frozen=benign) that was uncomplicated. Patient was stable overnight and progressing well postoperatively.     Neuro: Pain well controlled on IV Tylenol, Toradol, Dilaudid PRN  - s/p b/l TAP blocks intraop  CV: Hemodynamically stable, f/u AM CBC  - H/o HTN, c/w Lopressor PRN  Pulm: O2 sat WNL on RA, Increase ambulation, encourage incentive spirometry use  GI: Tolerating clear diet. Zofran, senna, simethicone PRN  - Advance diet as tolerated  : Garcia until Sat. UOP adequate o/n  - s/p b/l ucaths  Heme: DVT ppx: HSQ, SCDs while in bed  ID: Afebrile, No signs of infection  Endo: h/o T2DM, c/w ISS  FEN: LR@125, Replete electrolytes PRN   Dispo: continued inpt care  [facility] recs pending    [facility] PGY2  Nanci Daly, PGY-4  Ezekiel Mercado, MS3

## 2023-08-25 NOTE — CONSULT NOTE ADULT - PROBLEM SELECTOR RECOMMENDATION 4
-f/u HgbA1c -- added to morning labs; if insufficient quantity please order for AM labs  -FS well controlled. Continue with ISS for now and continue to monitor FS closely.  -holding metformin and trulicity

## 2023-08-25 NOTE — PROGRESS NOTE ADULT - SUBJECTIVE AND OBJECTIVE BOX
67yo s/p  POD #     Pt seen and examined at bedside. No overnight events.  Pt without complaints. Pain well controlled on current regimen.   She denies SOB/CP/palpitations, fever/chills, nausea/emesis. Tolerating regular diet.  +OOB,  No flatus, frazier in place    MEDICATIONS  (STANDING):  acetaminophen   IVPB .. 1000 milliGRAM(s) IV Intermittent once  dextrose 5%. 1000 milliLiter(s) (100 mL/Hr) IV Continuous <Continuous>  dextrose 5%. 1000 milliLiter(s) (50 mL/Hr) IV Continuous <Continuous>  dextrose 50% Injectable 25 Gram(s) IV Push once  dextrose 50% Injectable 12.5 Gram(s) IV Push once  dextrose 50% Injectable 25 Gram(s) IV Push once  glucagon  Injectable 1 milliGRAM(s) IntraMuscular once  heparin   Injectable 5000 Unit(s) SubCutaneous every 8 hours  insulin lispro (ADMELOG) corrective regimen sliding scale   SubCutaneous at bedtime  insulin lispro (ADMELOG) corrective regimen sliding scale   SubCutaneous three times a day before meals  ketorolac   Injectable 30 milliGRAM(s) IV Push every 6 hours  lactated ringers. 1000 milliLiter(s) (125 mL/Hr) IV Continuous <Continuous>  senna 1 Tablet(s) Oral at bedtime  simethicone 80 milliGRAM(s) Chew five times a day    MEDICATIONS  (PRN):  dextrose Oral Gel 15 Gram(s) Oral once PRN Blood Glucose LESS THAN 70 milliGRAM(s)/deciliter  HYDROmorphone  Injectable 0.5 milliGRAM(s) IV Push every 6 hours PRN Severe Pain (7 - 10)  metoprolol tartrate Injectable 5 milliGRAM(s) IV Push every 6 hours PRN BP >160/110  ondansetron Injectable 4 milliGRAM(s) IV Push every 6 hours PRN Nausea and/or Vomiting        Vital Signs Last 24 Hrs  T(C): 37.1 (25 Aug 2023 01:41), Max: 37.1 (25 Aug 2023 01:41)  T(F): 98.8 (25 Aug 2023 01:41), Max: 98.8 (25 Aug 2023 01:41)  HR: 93 (25 Aug 2023 01:41) (76 - 100)  BP: 126/57 (25 Aug 2023 01:41) (107/59 - 141/81)  BP(mean): 96 (24 Aug 2023 15:00) (70 - 96)  RR: 18 (25 Aug 2023 01:41) (13 - 20)  SpO2: 97% (25 Aug 2023 01:41) (94% - 100%)    Parameters below as of 25 Aug 2023 01:41  Patient On (Oxygen Delivery Method): room air        08-24 @ 07:01  -  08-25 @ 05:43  --------------------------------------------------------  IN: 1245 mL / OUT: 1625 mL / NET: -380 mL        PHYSICAL EXAM:  Gen: NAD, A+O x 3  CV: RRR  Pulm: CTA BL  Abd: Soft, appropriately tender,  mildly distended, no rebound or guarding, +BS  Incision: Clean, dry and intact; dressing in place  Extremities: No swelling or calf tenderness, SCDs in place    Labs, additional tests:             12.7   12.22 )-----------( 263      ( 08-24 @ 12:55 )             39.5                14.4   5.70  )-----------( 286      ( 08-24 @ 06:15 )             44.2       08-24    140  |  105  |  12  ----------------------------<  146<H>  3.8   |  21<L>  |  0.95    Ca    9.0      24 Aug 2023 12:55    TPro  9.1<H>  /  Alb  5.1<H>  /  TBili  0.4  /  DBili  x   /  AST  20  /  ALT  19  /  AlkPhos  76  08-24       67yo s/p Total Abdominal Hysterectomy. Bilateral Salpingo-oophorectomy, Removal of Peritoneal nodule, resection of leftgluteal skin tag, cysto, b/l ucaths  POD # 1    Pt seen and examined at bedside. No overnight events.  Pt without complaints. Pain well controlled on current regimen.   She denies SOB/CP/palpitations, fever/chills, nausea/emesis. Tolerating clear diet.  +OOB,  No flatus, frazier in place    MEDICATIONS  (STANDING):  acetaminophen   IVPB .. 1000 milliGRAM(s) IV Intermittent once  dextrose 5%. 1000 milliLiter(s) (100 mL/Hr) IV Continuous <Continuous>  dextrose 5%. 1000 milliLiter(s) (50 mL/Hr) IV Continuous <Continuous>  dextrose 50% Injectable 25 Gram(s) IV Push once  dextrose 50% Injectable 12.5 Gram(s) IV Push once  dextrose 50% Injectable 25 Gram(s) IV Push once  glucagon  Injectable 1 milliGRAM(s) IntraMuscular once  heparin   Injectable 5000 Unit(s) SubCutaneous every 8 hours  insulin lispro (ADMELOG) corrective regimen sliding scale   SubCutaneous at bedtime  insulin lispro (ADMELOG) corrective regimen sliding scale   SubCutaneous three times a day before meals  ketorolac   Injectable 30 milliGRAM(s) IV Push every 6 hours  lactated ringers. 1000 milliLiter(s) (125 mL/Hr) IV Continuous <Continuous>  senna 1 Tablet(s) Oral at bedtime  simethicone 80 milliGRAM(s) Chew five times a day    MEDICATIONS  (PRN):  dextrose Oral Gel 15 Gram(s) Oral once PRN Blood Glucose LESS THAN 70 milliGRAM(s)/deciliter  HYDROmorphone  Injectable 0.5 milliGRAM(s) IV Push every 6 hours PRN Severe Pain (7 - 10)  metoprolol tartrate Injectable 5 milliGRAM(s) IV Push every 6 hours PRN BP >160/110  ondansetron Injectable 4 milliGRAM(s) IV Push every 6 hours PRN Nausea and/or Vomiting        Vital Signs Last 24 Hrs  T(C): 37.1 (25 Aug 2023 01:41), Max: 37.1 (25 Aug 2023 01:41)  T(F): 98.8 (25 Aug 2023 01:41), Max: 98.8 (25 Aug 2023 01:41)  HR: 93 (25 Aug 2023 01:41) (76 - 100)  BP: 126/57 (25 Aug 2023 01:41) (107/59 - 141/81)  BP(mean): 96 (24 Aug 2023 15:00) (70 - 96)  RR: 18 (25 Aug 2023 01:41) (13 - 20)  SpO2: 97% (25 Aug 2023 01:41) (94% - 100%)    Parameters below as of 25 Aug 2023 01:41  Patient On (Oxygen Delivery Method): room air        08-24 @ 07:01  -  08-25 @ 05:43  --------------------------------------------------------  IN: 1245 mL / OUT: 1625 mL / NET: -380 mL        PHYSICAL EXAM:  Gen: NAD, A+O x 3  CV: RRR  Pulm: CTA BL  Abd: Soft, appropriately tender,  mildly distended, no rebound or guarding, +BS  Incision: Midline vertical well healing without bleeding, erythema or drainage L gluteal opsite dressing in place  Extremities: No swelling or calf tenderness, SCDs in place    Labs, additional tests:             12.7   12.22 )-----------( 263      ( 08-24 @ 12:55 )             39.5                14.4   5.70  )-----------( 286      ( 08-24 @ 06:15 )             44.2       08-24    140  |  105  |  12  ----------------------------<  146<H>  3.8   |  21<L>  |  0.95    Ca    9.0      24 Aug 2023 12:55    TPro  9.1<H>  /  Alb  5.1<H>  /  TBili  0.4  /  DBili  x   /  AST  20  /  ALT  19  /  AlkPhos  76  08-24       67yo s/p Total Abdominal Hysterectomy. Bilateral Salpingo-oophorectomy, Removal of Peritoneal nodule, resection of leftgluteal skin tag, cysto, b/l ucaths  POD # 1    Pt seen and examined at bedside. No overnight events.  Pt without complaints. Pain well controlled on current regimen.   She denies SOB/CP/palpitations, fever/chills, nausea/emesis. Tolerating clear diet.  +OOB,  No flatus, frazier in place    MEDICATIONS  (STANDING):  acetaminophen   IVPB .. 1000 milliGRAM(s) IV Intermittent once  dextrose 5%. 1000 milliLiter(s) (100 mL/Hr) IV Continuous <Continuous>  dextrose 5%. 1000 milliLiter(s) (50 mL/Hr) IV Continuous <Continuous>  dextrose 50% Injectable 25 Gram(s) IV Push once  dextrose 50% Injectable 12.5 Gram(s) IV Push once  dextrose 50% Injectable 25 Gram(s) IV Push once  glucagon  Injectable 1 milliGRAM(s) IntraMuscular once  heparin   Injectable 5000 Unit(s) SubCutaneous every 8 hours  insulin lispro (ADMELOG) corrective regimen sliding scale   SubCutaneous at bedtime  insulin lispro (ADMELOG) corrective regimen sliding scale   SubCutaneous three times a day before meals  ketorolac   Injectable 30 milliGRAM(s) IV Push every 6 hours  lactated ringers. 1000 milliLiter(s) (125 mL/Hr) IV Continuous <Continuous>  senna 1 Tablet(s) Oral at bedtime  simethicone 80 milliGRAM(s) Chew five times a day    MEDICATIONS  (PRN):  dextrose Oral Gel 15 Gram(s) Oral once PRN Blood Glucose LESS THAN 70 milliGRAM(s)/deciliter  HYDROmorphone  Injectable 0.5 milliGRAM(s) IV Push every 6 hours PRN Severe Pain (7 - 10)  metoprolol tartrate Injectable 5 milliGRAM(s) IV Push every 6 hours PRN BP >160/110  ondansetron Injectable 4 milliGRAM(s) IV Push every 6 hours PRN Nausea and/or Vomiting        Vital Signs Last 24 Hrs  T(C): 37.1 (25 Aug 2023 01:41), Max: 37.1 (25 Aug 2023 01:41)  T(F): 98.8 (25 Aug 2023 01:41), Max: 98.8 (25 Aug 2023 01:41)  HR: 93 (25 Aug 2023 01:41) (76 - 100)  BP: 126/57 (25 Aug 2023 01:41) (107/59 - 141/81)  BP(mean): 96 (24 Aug 2023 15:00) (70 - 96)  RR: 18 (25 Aug 2023 01:41) (13 - 20)  SpO2: 97% (25 Aug 2023 01:41) (94% - 100%)    Parameters below as of 25 Aug 2023 01:41  Patient On (Oxygen Delivery Method): room air        08-24 @ 07:01  -  08-25 @ 05:43  --------------------------------------------------------  IN: 1245 mL / OUT: 1625 mL / NET: -380 mL        PHYSICAL EXAM:  Gen: NAD, A+O x 3  CV: RRR  Pulm: CTA BL  Abd: Soft, appropriately tender,  mildly distended, no rebound or guarding, +BS  Incision:  Midline vertical with dermabond prineo covering. No surrounding erythema, drainage or edema. Well healing. L gluteal opsite dressing in place  Extremities: No swelling or calf tenderness, SCDs in place    Labs, additional tests:             12.7   12.22 )-----------( 263      ( 08-24 @ 12:55 )             39.5                14.4   5.70  )-----------( 286      ( 08-24 @ 06:15 )             44.2       08-24    140  |  105  |  12  ----------------------------<  146<H>  3.8   |  21<L>  |  0.95    Ca    9.0      24 Aug 2023 12:55    TPro  9.1<H>  /  Alb  5.1<H>  /  TBili  0.4  /  DBili  x   /  AST  20  /  ALT  19  /  AlkPhos  76  08-24       69yo s/p Total Abdominal Hysterectomy. Bilateral Salpingo-oophorectomy, Removal of Peritoneal nodule, resection of leftgluteal skin tag, cysto, b/l ucaths  POD # 1    Pt seen and examined at bedside. No overnight events.  Pt without complaints. Pain well controlled on current regimen.   She denies SOB/CP/palpitations, fever/chills, nausea/emesis. Tolerating clear diet.  +OOB,  has been having flatus, frazier in place    MEDICATIONS  (STANDING):  acetaminophen   IVPB .. 1000 milliGRAM(s) IV Intermittent once  dextrose 5%. 1000 milliLiter(s) (100 mL/Hr) IV Continuous <Continuous>  dextrose 5%. 1000 milliLiter(s) (50 mL/Hr) IV Continuous <Continuous>  dextrose 50% Injectable 25 Gram(s) IV Push once  dextrose 50% Injectable 12.5 Gram(s) IV Push once  dextrose 50% Injectable 25 Gram(s) IV Push once  glucagon  Injectable 1 milliGRAM(s) IntraMuscular once  heparin   Injectable 5000 Unit(s) SubCutaneous every 8 hours  insulin lispro (ADMELOG) corrective regimen sliding scale   SubCutaneous at bedtime  insulin lispro (ADMELOG) corrective regimen sliding scale   SubCutaneous three times a day before meals  ketorolac   Injectable 30 milliGRAM(s) IV Push every 6 hours  lactated ringers. 1000 milliLiter(s) (125 mL/Hr) IV Continuous <Continuous>  senna 1 Tablet(s) Oral at bedtime  simethicone 80 milliGRAM(s) Chew five times a day    MEDICATIONS  (PRN):  dextrose Oral Gel 15 Gram(s) Oral once PRN Blood Glucose LESS THAN 70 milliGRAM(s)/deciliter  HYDROmorphone  Injectable 0.5 milliGRAM(s) IV Push every 6 hours PRN Severe Pain (7 - 10)  metoprolol tartrate Injectable 5 milliGRAM(s) IV Push every 6 hours PRN BP >160/110  ondansetron Injectable 4 milliGRAM(s) IV Push every 6 hours PRN Nausea and/or Vomiting        Vital Signs Last 24 Hrs  T(C): 37.1 (25 Aug 2023 01:41), Max: 37.1 (25 Aug 2023 01:41)  T(F): 98.8 (25 Aug 2023 01:41), Max: 98.8 (25 Aug 2023 01:41)  HR: 93 (25 Aug 2023 01:41) (76 - 100)  BP: 126/57 (25 Aug 2023 01:41) (107/59 - 141/81)  BP(mean): 96 (24 Aug 2023 15:00) (70 - 96)  RR: 18 (25 Aug 2023 01:41) (13 - 20)  SpO2: 97% (25 Aug 2023 01:41) (94% - 100%)    Parameters below as of 25 Aug 2023 01:41  Patient On (Oxygen Delivery Method): room air        08-24 @ 07:01  -  08-25 @ 05:43  --------------------------------------------------------  IN: 1245 mL / OUT: 1625 mL / NET: -380 mL        PHYSICAL EXAM:  Gen: NAD, A+O x 3  CV: RRR  Pulm: CTA BL  Abd: Soft, appropriately tender,  mildly distended, no rebound or guarding, +BS  Incision:  Midline vertical with dermabond prineo covering. No surrounding erythema, drainage or edema. Well healing. L gluteal opsite dressing in place  Extremities: No swelling or calf tenderness, SCDs in place    Labs, additional tests:             12.7   12.22 )-----------( 263      ( 08-24 @ 12:55 )             39.5                14.4   5.70  )-----------( 286      ( 08-24 @ 06:15 )             44.2       08-24    140  |  105  |  12  ----------------------------<  146<H>  3.8   |  21<L>  |  0.95    Ca    9.0      24 Aug 2023 12:55    TPro  9.1<H>  /  Alb  5.1<H>  /  TBili  0.4  /  DBili  x   /  AST  20  /  ALT  19  /  AlkPhos  76  08-24       R2 GYN Onc Prog Note POD#1 HD#2    Pt seen and examined at bedside. No overnight events. Pt without complaints. Pain well controlled on current regimen.   She denies SOB/CP/palpitations, fever/chills, nausea/emesis. Tolerating clear liquid diet.  +OOB. Not yet passing flatus. Garcia in place.    MEDICATIONS  (STANDING):  acetaminophen   IVPB .. 1000 milliGRAM(s) IV Intermittent once  dextrose 5%. 1000 milliLiter(s) (100 mL/Hr) IV Continuous <Continuous>  dextrose 5%. 1000 milliLiter(s) (50 mL/Hr) IV Continuous <Continuous>  dextrose 50% Injectable 25 Gram(s) IV Push once  dextrose 50% Injectable 12.5 Gram(s) IV Push once  dextrose 50% Injectable 25 Gram(s) IV Push once  glucagon  Injectable 1 milliGRAM(s) IntraMuscular once  heparin   Injectable 5000 Unit(s) SubCutaneous every 8 hours  insulin lispro (ADMELOG) corrective regimen sliding scale   SubCutaneous at bedtime  insulin lispro (ADMELOG) corrective regimen sliding scale   SubCutaneous three times a day before meals  ketorolac   Injectable 30 milliGRAM(s) IV Push every 6 hours  lactated ringers. 1000 milliLiter(s) (125 mL/Hr) IV Continuous <Continuous>  senna 1 Tablet(s) Oral at bedtime  simethicone 80 milliGRAM(s) Chew five times a day    MEDICATIONS  (PRN):  dextrose Oral Gel 15 Gram(s) Oral once PRN Blood Glucose LESS THAN 70 milliGRAM(s)/deciliter  HYDROmorphone  Injectable 0.5 milliGRAM(s) IV Push every 6 hours PRN Severe Pain (7 - 10)  metoprolol tartrate Injectable 5 milliGRAM(s) IV Push every 6 hours PRN BP >160/110  ondansetron Injectable 4 milliGRAM(s) IV Push every 6 hours PRN Nausea and/or Vomiting        Vital Signs Last 24 Hrs  T(C): 37.1 (25 Aug 2023 01:41), Max: 37.1 (25 Aug 2023 01:41)  T(F): 98.8 (25 Aug 2023 01:41), Max: 98.8 (25 Aug 2023 01:41)  HR: 93 (25 Aug 2023 01:41) (76 - 100)  BP: 126/57 (25 Aug 2023 01:41) (107/59 - 141/81)  BP(mean): 96 (24 Aug 2023 15:00) (70 - 96)  RR: 18 (25 Aug 2023 01:41) (13 - 20)  SpO2: 97% (25 Aug 2023 01:41) (94% - 100%)    Parameters below as of 25 Aug 2023 01:41  Patient On (Oxygen Delivery Method): room air        08-24 @ 07:01  -  08-25 @ 05:43  --------------------------------------------------------  IN: 1245 mL / OUT: 1625 mL / NET: -380 mL        PHYSICAL EXAM:  Gen: NAD, A+O x 3  CV: RRR  Pulm: CTA BL  Abd: Soft, appropriately tender,  mildly distended, no rebound or guarding, +BS  Incision:  Midline vertical with dermabond prineo covering. No surrounding erythema, drainage or edema. Well healing. L gluteal opsite dressing in place  Extremities: No swelling or calf tenderness, SCDs in place    Labs, additional tests:             12.7   12.22 )-----------( 263      ( 08-24 @ 12:55 )             39.5                14.4   5.70  )-----------( 286      ( 08-24 @ 06:15 )             44.2       08-24    140  |  105  |  12  ----------------------------<  146<H>  3.8   |  21<L>  |  0.95    Ca    9.0      24 Aug 2023 12:55    TPro  9.1<H>  /  Alb  5.1<H>  /  TBili  0.4  /  DBili  x   /  AST  20  /  ALT  19  /  AlkPhos  76  08-24       R2 GYN Onc Prog Note POD#1 HD#2    Pt seen and examined at bedside. No overnight events. Pt without complaints. Pain well controlled on current regimen.   She denies SOB/CP/palpitations, fever/chills, nausea/emesis. Tolerating clear liquid diet.  +OOB. Not yet passing flatus. Garcia in place.    MEDICATIONS  (STANDING):  acetaminophen   IVPB .. 1000 milliGRAM(s) IV Intermittent once  dextrose 5%. 1000 milliLiter(s) (100 mL/Hr) IV Continuous <Continuous>  dextrose 5%. 1000 milliLiter(s) (50 mL/Hr) IV Continuous <Continuous>  dextrose 50% Injectable 25 Gram(s) IV Push once  dextrose 50% Injectable 12.5 Gram(s) IV Push once  dextrose 50% Injectable 25 Gram(s) IV Push once  glucagon  Injectable 1 milliGRAM(s) IntraMuscular once  heparin   Injectable 5000 Unit(s) SubCutaneous every 8 hours  insulin lispro (ADMELOG) corrective regimen sliding scale   SubCutaneous at bedtime  insulin lispro (ADMELOG) corrective regimen sliding scale   SubCutaneous three times a day before meals  ketorolac   Injectable 30 milliGRAM(s) IV Push every 6 hours  lactated ringers. 1000 milliLiter(s) (125 mL/Hr) IV Continuous <Continuous>  senna 1 Tablet(s) Oral at bedtime  simethicone 80 milliGRAM(s) Chew five times a day    MEDICATIONS  (PRN):  dextrose Oral Gel 15 Gram(s) Oral once PRN Blood Glucose LESS THAN 70 milliGRAM(s)/deciliter  HYDROmorphone  Injectable 0.5 milliGRAM(s) IV Push every 6 hours PRN Severe Pain (7 - 10)  metoprolol tartrate Injectable 5 milliGRAM(s) IV Push every 6 hours PRN BP >160/110  ondansetron Injectable 4 milliGRAM(s) IV Push every 6 hours PRN Nausea and/or Vomiting        Vital Signs Last 24 Hrs  T(C): 37.1 (25 Aug 2023 01:41), Max: 37.1 (25 Aug 2023 01:41)  T(F): 98.8 (25 Aug 2023 01:41), Max: 98.8 (25 Aug 2023 01:41)  HR: 93 (25 Aug 2023 01:41) (76 - 100)  BP: 126/57 (25 Aug 2023 01:41) (107/59 - 141/81)  BP(mean): 96 (24 Aug 2023 15:00) (70 - 96)  RR: 18 (25 Aug 2023 01:41) (13 - 20)  SpO2: 97% (25 Aug 2023 01:41) (94% - 100%)    Parameters below as of 25 Aug 2023 01:41  Patient On (Oxygen Delivery Method): room air        08-24 @ 07:01  -  08-25 @ 05:43  --------------------------------------------------------  IN: 1245 mL / OUT: 1625 mL / NET: -380 mL        PHYSICAL EXAM:  Gen: NAD, A+O x 3  CV: RRR  Pulm: CTA BL  Abd: Soft, appropriately tender,  mildly distended, no rebound or guarding, +BS  Incision:  Midline vertical with dermabond prineo covering. No surrounding erythema, drainage or edema. Well healing. L gluteal opsite dressing in place c/d/i  Extremities: No swelling or calf tenderness, SCDs in place    Labs, additional tests:             12.7   12.22 )-----------( 263      ( 08-24 @ 12:55 )             39.5                14.4   5.70  )-----------( 286      ( 08-24 @ 06:15 )             44.2       08-24    140  |  105  |  12  ----------------------------<  146<H>  3.8   |  21<L>  |  0.95    Ca    9.0      24 Aug 2023 12:55    TPro  9.1<H>  /  Alb  5.1<H>  /  TBili  0.4  /  DBili  x   /  AST  20  /  ALT  19  /  AlkPhos  76  08-24

## 2023-08-26 DIAGNOSIS — Z98.890 OTHER SPECIFIED POSTPROCEDURAL STATES: ICD-10-CM

## 2023-08-26 LAB
ANION GAP SERPL CALC-SCNC: 7 MMOL/L — SIGNIFICANT CHANGE UP (ref 7–14)
BASOPHILS # BLD AUTO: 0.06 K/UL — SIGNIFICANT CHANGE UP (ref 0–0.2)
BASOPHILS NFR BLD AUTO: 0.8 % — SIGNIFICANT CHANGE UP (ref 0–2)
BUN SERPL-MCNC: 10 MG/DL — SIGNIFICANT CHANGE UP (ref 7–23)
CALCIUM SERPL-MCNC: 8.8 MG/DL — SIGNIFICANT CHANGE UP (ref 8.4–10.5)
CHLORIDE SERPL-SCNC: 105 MMOL/L — SIGNIFICANT CHANGE UP (ref 98–107)
CO2 SERPL-SCNC: 29 MMOL/L — SIGNIFICANT CHANGE UP (ref 22–31)
CREAT SERPL-MCNC: 0.98 MG/DL — SIGNIFICANT CHANGE UP (ref 0.5–1.3)
EGFR: 64 ML/MIN/1.73M2 — SIGNIFICANT CHANGE UP
EOSINOPHIL # BLD AUTO: 0.25 K/UL — SIGNIFICANT CHANGE UP (ref 0–0.5)
EOSINOPHIL NFR BLD AUTO: 3.2 % — SIGNIFICANT CHANGE UP (ref 0–6)
GLUCOSE BLDC GLUCOMTR-MCNC: 101 MG/DL — HIGH (ref 70–99)
GLUCOSE BLDC GLUCOMTR-MCNC: 110 MG/DL — HIGH (ref 70–99)
GLUCOSE BLDC GLUCOMTR-MCNC: 115 MG/DL — HIGH (ref 70–99)
GLUCOSE BLDC GLUCOMTR-MCNC: 131 MG/DL — HIGH (ref 70–99)
GLUCOSE SERPL-MCNC: 91 MG/DL — SIGNIFICANT CHANGE UP (ref 70–99)
HCT VFR BLD CALC: 32.7 % — LOW (ref 34.5–45)
HGB BLD-MCNC: 10.5 G/DL — LOW (ref 11.5–15.5)
IANC: 4.01 K/UL — SIGNIFICANT CHANGE UP (ref 1.8–7.4)
IMM GRANULOCYTES NFR BLD AUTO: 0.3 % — SIGNIFICANT CHANGE UP (ref 0–0.9)
LYMPHOCYTES # BLD AUTO: 2.96 K/UL — SIGNIFICANT CHANGE UP (ref 1–3.3)
LYMPHOCYTES # BLD AUTO: 37.5 % — SIGNIFICANT CHANGE UP (ref 13–44)
MCHC RBC-ENTMCNC: 28.8 PG — SIGNIFICANT CHANGE UP (ref 27–34)
MCHC RBC-ENTMCNC: 32.1 GM/DL — SIGNIFICANT CHANGE UP (ref 32–36)
MCV RBC AUTO: 89.6 FL — SIGNIFICANT CHANGE UP (ref 80–100)
MONOCYTES # BLD AUTO: 0.59 K/UL — SIGNIFICANT CHANGE UP (ref 0–0.9)
MONOCYTES NFR BLD AUTO: 7.5 % — SIGNIFICANT CHANGE UP (ref 2–14)
NEUTROPHILS # BLD AUTO: 4.01 K/UL — SIGNIFICANT CHANGE UP (ref 1.8–7.4)
NEUTROPHILS NFR BLD AUTO: 50.7 % — SIGNIFICANT CHANGE UP (ref 43–77)
NRBC # BLD: 0 /100 WBCS — SIGNIFICANT CHANGE UP (ref 0–0)
NRBC # FLD: 0 K/UL — SIGNIFICANT CHANGE UP (ref 0–0)
PLATELET # BLD AUTO: 240 K/UL — SIGNIFICANT CHANGE UP (ref 150–400)
POTASSIUM SERPL-MCNC: 4.1 MMOL/L — SIGNIFICANT CHANGE UP (ref 3.5–5.3)
POTASSIUM SERPL-SCNC: 4.1 MMOL/L — SIGNIFICANT CHANGE UP (ref 3.5–5.3)
RBC # BLD: 3.65 M/UL — LOW (ref 3.8–5.2)
RBC # FLD: 13.2 % — SIGNIFICANT CHANGE UP (ref 10.3–14.5)
SODIUM SERPL-SCNC: 141 MMOL/L — SIGNIFICANT CHANGE UP (ref 135–145)
WBC # BLD: 7.89 K/UL — SIGNIFICANT CHANGE UP (ref 3.8–10.5)
WBC # FLD AUTO: 7.89 K/UL — SIGNIFICANT CHANGE UP (ref 3.8–10.5)

## 2023-08-26 PROCEDURE — 99233 SBSQ HOSP IP/OBS HIGH 50: CPT

## 2023-08-26 RX ADMIN — SIMETHICONE 80 MILLIGRAM(S): 80 TABLET, CHEWABLE ORAL at 20:05

## 2023-08-26 RX ADMIN — ATORVASTATIN CALCIUM 20 MILLIGRAM(S): 80 TABLET, FILM COATED ORAL at 21:26

## 2023-08-26 RX ADMIN — SIMETHICONE 80 MILLIGRAM(S): 80 TABLET, CHEWABLE ORAL at 23:46

## 2023-08-26 RX ADMIN — OXYCODONE HYDROCHLORIDE 2.5 MILLIGRAM(S): 5 TABLET ORAL at 10:44

## 2023-08-26 RX ADMIN — ENOXAPARIN SODIUM 40 MILLIGRAM(S): 100 INJECTION SUBCUTANEOUS at 17:56

## 2023-08-26 RX ADMIN — Medication 975 MILLIGRAM(S): at 00:48

## 2023-08-26 RX ADMIN — AMLODIPINE BESYLATE 10 MILLIGRAM(S): 2.5 TABLET ORAL at 06:05

## 2023-08-26 RX ADMIN — Medication 975 MILLIGRAM(S): at 06:05

## 2023-08-26 RX ADMIN — OXYCODONE HYDROCHLORIDE 2.5 MILLIGRAM(S): 5 TABLET ORAL at 09:48

## 2023-08-26 RX ADMIN — Medication 600 MILLIGRAM(S): at 21:27

## 2023-08-26 RX ADMIN — Medication 600 MILLIGRAM(S): at 12:58

## 2023-08-26 RX ADMIN — Medication 975 MILLIGRAM(S): at 23:46

## 2023-08-26 RX ADMIN — Medication 975 MILLIGRAM(S): at 17:56

## 2023-08-26 RX ADMIN — Medication 600 MILLIGRAM(S): at 22:27

## 2023-08-26 RX ADMIN — Medication 600 MILLIGRAM(S): at 06:05

## 2023-08-26 RX ADMIN — SENNA PLUS 1 TABLET(S): 8.6 TABLET ORAL at 21:26

## 2023-08-26 RX ADMIN — SIMETHICONE 80 MILLIGRAM(S): 80 TABLET, CHEWABLE ORAL at 17:56

## 2023-08-26 RX ADMIN — SIMETHICONE 80 MILLIGRAM(S): 80 TABLET, CHEWABLE ORAL at 09:48

## 2023-08-26 RX ADMIN — Medication 600 MILLIGRAM(S): at 07:05

## 2023-08-26 RX ADMIN — Medication 975 MILLIGRAM(S): at 07:05

## 2023-08-26 RX ADMIN — Medication 600 MILLIGRAM(S): at 00:48

## 2023-08-26 NOTE — PROGRESS NOTE ADULT - PROBLEM SELECTOR PLAN 1
GYN ONC Fellow Addendum:    Pt seen and examined at bedside. Agree with above. Pain well controlled. Tolerating reg diet. +flatus. OOB    VS reviewed  Labs reviewed    D/c KIN frazier  Continue current pain regimen  Tolerating reg diet  Encourage ambulation and IS use  Replete lytes prn  DVT ppx: Lovenox  Dispo: d/c tomorrow AM    Little Browning MD

## 2023-08-26 NOTE — PROGRESS NOTE ADULT - ASSESSMENT
This 68y female, s/p exploratory laparotomy, total abdominal hysterectomy, bilateral salpingooophorectomy, removal of peritoneal nodule, resection of left gluteal skin tag, cystoscopy, b/l ucaths (ebl 600) for fibroids (frozen=benign) that was uncomplicated. Patient was stable overnight and progressing well postoperatively.     CV: hemodynamically stable, H/H  PUL: adequate on RA  GI: tolerating regular CC diet   : Frazier with adequate output. DC frazier this am  ID: afebrile, WBC stable  Endo: blood glucose controlled, continue to monitor   DVT prophylaxis: Lovenox, ambulation and SCDs when in bed  Pain Management: controlled on current regimen  d/w attending and fellow morning rounds  -electrolytes stable  -encourage ambulation and OOB to chair  -encourage incentive spirometry  consider d/c home after patient voids.     Radha Waller, PAC  #[id number] spectra

## 2023-08-26 NOTE — PROGRESS NOTE ADULT - PROBLEM SELECTOR PLAN 1
-Pain well controlled; continue management and pain control per gyn-onc recs with tylenol q6hrs, oxycodone IR prn, motrin q6hrs  -c/w bowel regimen  -c/w incentive spirometer use  -c/w PT  -plan for TOV today  -rest of plan gyn-onc team. -Pain well controlled; continue management and pain control per gyn-onc recs with tylenol q6hrs, oxycodone IR prn, motrin q6hrs  -c/w bowel regimen  -c/w incentive spirometer use  -c/w PT  -passed TOV today  -rest of plan gyn-onc team.

## 2023-08-26 NOTE — PROGRESS NOTE ADULT - SUBJECTIVE AND OBJECTIVE BOX
Patient is a 68y old  Female who presents with a chief complaint of patient presented for scheduled surgery (26 Aug 2023 08:22)      SUBJECTIVE / OVERNIGHT EVENTS:    MEDICATIONS  (STANDING):  acetaminophen     Tablet .. 975 milliGRAM(s) Oral every 6 hours  amLODIPine   Tablet 10 milliGRAM(s) Oral daily  atorvastatin 20 milliGRAM(s) Oral at bedtime  dextrose 5%. 1000 milliLiter(s) (50 mL/Hr) IV Continuous <Continuous>  dextrose 5%. 1000 milliLiter(s) (100 mL/Hr) IV Continuous <Continuous>  dextrose 50% Injectable 25 Gram(s) IV Push once  dextrose 50% Injectable 12.5 Gram(s) IV Push once  dextrose 50% Injectable 25 Gram(s) IV Push once  enoxaparin Injectable 40 milliGRAM(s) SubCutaneous every 24 hours  glucagon  Injectable 1 milliGRAM(s) IntraMuscular once  ibuprofen  Tablet. 600 milliGRAM(s) Oral every 6 hours  insulin lispro (ADMELOG) corrective regimen sliding scale   SubCutaneous three times a day before meals  insulin lispro (ADMELOG) corrective regimen sliding scale   SubCutaneous at bedtime  senna 1 Tablet(s) Oral at bedtime  simethicone 80 milliGRAM(s) Chew five times a day    MEDICATIONS  (PRN):  dextrose Oral Gel 15 Gram(s) Oral once PRN Blood Glucose LESS THAN 70 milliGRAM(s)/deciliter  metoprolol tartrate Injectable 5 milliGRAM(s) IV Push every 6 hours PRN BP >160/110  ondansetron Injectable 4 milliGRAM(s) IV Push every 6 hours PRN Nausea and/or Vomiting  oxyCODONE    IR 5 milliGRAM(s) Oral every 4 hours PRN Severe Pain (7 - 10)  oxyCODONE    IR 2.5 milliGRAM(s) Oral every 4 hours PRN Moderate Pain (4 - 6)      Vital Signs Last 24 Hrs  T(C): 36.9 (26 Aug 2023 14:00), Max: 37.6 (25 Aug 2023 17:31)  T(F): 98.5 (26 Aug 2023 14:00), Max: 99.6 (25 Aug 2023 17:31)  HR: 84 (26 Aug 2023 14:00) (65 - 93)  BP: 120/62 (26 Aug 2023 14:00) (103/58 - 142/63)  BP(mean): --  RR: 16 (26 Aug 2023 14:00) (16 - 17)  SpO2: 100% (26 Aug 2023 14:00) (95% - 100%)    Parameters below as of 26 Aug 2023 10:18  Patient On (Oxygen Delivery Method): room air      CAPILLARY BLOOD GLUCOSE      POCT Blood Glucose.: 110 mg/dL (26 Aug 2023 11:46)  POCT Blood Glucose.: 101 mg/dL (26 Aug 2023 07:48)  POCT Blood Glucose.: 116 mg/dL (25 Aug 2023 22:00)  POCT Blood Glucose.: 96 mg/dL (25 Aug 2023 17:00)    I&O's Summary    25 Aug 2023 07:01  -  26 Aug 2023 07:00  --------------------------------------------------------  IN: 2140 mL / OUT: 2801 mL / NET: -661 mL    26 Aug 2023 07:01  -  26 Aug 2023 15:49  --------------------------------------------------------  IN: 0 mL / OUT: 2100 mL / NET: -2100 mL        PHYSICAL EXAM:  GENERAL: NAD, well-developed  HEAD:  Atraumatic, Normocephalic  EYES: EOMI, PERRLA, conjunctiva and sclera clear  NECK: Supple, No JVD  CHEST/LUNG: Clear to auscultation bilaterally; No wheeze  HEART: Regular rate and rhythm; No murmurs, rubs, or gallops  ABDOMEN: Soft, Nontender, Nondistended; Bowel sounds present  EXTREMITIES:  2+ Peripheral Pulses, No clubbing, cyanosis, or edema  PSYCH: AAOx3  NEUROLOGY: non-focal  SKIN: No rashes or lesions    LABS:                        10.5   7.89  )-----------( 240      ( 26 Aug 2023 05:27 )             32.7     08-26    141  |  105  |  10  ----------------------------<  91  4.1   |  29  |  0.98    Ca    8.8      26 Aug 2023 05:27            Urinalysis Basic - ( 26 Aug 2023 05:27 )    Color: x / Appearance: x / SG: x / pH: x  Gluc: 91 mg/dL / Ketone: x  / Bili: x / Urobili: x   Blood: x / Protein: x / Nitrite: x   Leuk Esterase: x / RBC: x / WBC x   Sq Epi: x / Non Sq Epi: x / Bacteria: x        RADIOLOGY & ADDITIONAL TESTS:    Imaging Personally Reviewed:    Consultant(s) Notes Reviewed:      Care Discussed with Consultants/Other Providers:   Patient is a 68y old  Female who presents with a chief complaint of patient presented for scheduled surgery (26 Aug 2023 08:22)      SUBJECTIVE / OVERNIGHT EVENTS:  Patient says she urinated on her own. Patient has no new complaints. Denies cp, SOB, abdominal pain, N/V/D     MEDICATIONS  (STANDING):  acetaminophen     Tablet .. 975 milliGRAM(s) Oral every 6 hours  amLODIPine   Tablet 10 milliGRAM(s) Oral daily  atorvastatin 20 milliGRAM(s) Oral at bedtime  dextrose 5%. 1000 milliLiter(s) (50 mL/Hr) IV Continuous <Continuous>  dextrose 5%. 1000 milliLiter(s) (100 mL/Hr) IV Continuous <Continuous>  dextrose 50% Injectable 25 Gram(s) IV Push once  dextrose 50% Injectable 12.5 Gram(s) IV Push once  dextrose 50% Injectable 25 Gram(s) IV Push once  enoxaparin Injectable 40 milliGRAM(s) SubCutaneous every 24 hours  glucagon  Injectable 1 milliGRAM(s) IntraMuscular once  ibuprofen  Tablet. 600 milliGRAM(s) Oral every 6 hours  insulin lispro (ADMELOG) corrective regimen sliding scale   SubCutaneous three times a day before meals  insulin lispro (ADMELOG) corrective regimen sliding scale   SubCutaneous at bedtime  senna 1 Tablet(s) Oral at bedtime  simethicone 80 milliGRAM(s) Chew five times a day    MEDICATIONS  (PRN):  dextrose Oral Gel 15 Gram(s) Oral once PRN Blood Glucose LESS THAN 70 milliGRAM(s)/deciliter  metoprolol tartrate Injectable 5 milliGRAM(s) IV Push every 6 hours PRN BP >160/110  ondansetron Injectable 4 milliGRAM(s) IV Push every 6 hours PRN Nausea and/or Vomiting  oxyCODONE    IR 5 milliGRAM(s) Oral every 4 hours PRN Severe Pain (7 - 10)  oxyCODONE    IR 2.5 milliGRAM(s) Oral every 4 hours PRN Moderate Pain (4 - 6)      Vital Signs Last 24 Hrs  T(C): 36.9 (26 Aug 2023 14:00), Max: 37.6 (25 Aug 2023 17:31)  T(F): 98.5 (26 Aug 2023 14:00), Max: 99.6 (25 Aug 2023 17:31)  HR: 84 (26 Aug 2023 14:00) (65 - 93)  BP: 120/62 (26 Aug 2023 14:00) (103/58 - 142/63)  BP(mean): --  RR: 16 (26 Aug 2023 14:00) (16 - 17)  SpO2: 100% (26 Aug 2023 14:00) (95% - 100%)    Parameters below as of 26 Aug 2023 10:18  Patient On (Oxygen Delivery Method): room air      CAPILLARY BLOOD GLUCOSE      POCT Blood Glucose.: 110 mg/dL (26 Aug 2023 11:46)  POCT Blood Glucose.: 101 mg/dL (26 Aug 2023 07:48)  POCT Blood Glucose.: 116 mg/dL (25 Aug 2023 22:00)  POCT Blood Glucose.: 96 mg/dL (25 Aug 2023 17:00)    I&O's Summary    25 Aug 2023 07:01  -  26 Aug 2023 07:00  --------------------------------------------------------  IN: 2140 mL / OUT: 2801 mL / NET: -661 mL    26 Aug 2023 07:01  -  26 Aug 2023 15:49  --------------------------------------------------------  IN: 0 mL / OUT: 2100 mL / NET: -2100 mL        PHYSICAL EXAM:  GENERAL: NAD, well-developed  HEAD:  Atraumatic, Normocephalic  EYES: EOMI, PERRLA, conjunctiva and sclera clear  NECK: Supple, No JVD  CHEST/LUNG: Clear to auscultation bilaterally; No wheeze  HEART: Regular rate and rhythm; No murmurs, rubs, or gallops  ABDOMEN: Soft, Nontender, Nondistended; Bowel sounds present  EXTREMITIES:  2+ Peripheral Pulses, No clubbing, cyanosis, or edema  PSYCH: AAOx3  NEUROLOGY: non-focal  SKIN: No rashes or lesions    LABS:                        10.5   7.89  )-----------( 240      ( 26 Aug 2023 05:27 )             32.7     08-26    141  |  105  |  10  ----------------------------<  91  4.1   |  29  |  0.98    Ca    8.8      26 Aug 2023 05:27            Urinalysis Basic - ( 26 Aug 2023 05:27 )    Color: x / Appearance: x / SG: x / pH: x  Gluc: 91 mg/dL / Ketone: x  / Bili: x / Urobili: x   Blood: x / Protein: x / Nitrite: x   Leuk Esterase: x / RBC: x / WBC x   Sq Epi: x / Non Sq Epi: x / Bacteria: x        RADIOLOGY & ADDITIONAL TESTS:    Imaging Personally Reviewed:    Consultant(s) Notes Reviewed:      Care Discussed with Consultants/Other Providers:

## 2023-08-26 NOTE — PROGRESS NOTE ADULT - SUBJECTIVE AND OBJECTIVE BOX
[facility] Progress Note POD #2    Pt seen, examined at bedside and doing well this am. Pt states mild abdominal pain.  Pt denies fever, chills, chest pain, SOB, nausea, vomiting, lightheadedness, dizziness.  Pt states passing flatus,    T(F): 97.7 (08-26-23 @ 06:03), Max: 99.6 (08-25-23 @ 17:31)  HR: 66 (08-26-23 @ 06:03) (65 - 78)  BP: 133/62 (08-26-23 @ 06:03) (103/58 - 142/63)  RR: 16 (08-26-23 @ 06:03) (15 - 18)  SpO2: 98% (08-26-23 @ 06:03) (95% - 99%)    I&O's Detail    25 Aug 2023 07:01  -  26 Aug 2023 07:00  --------------------------------------------------------  IN:    Lactated Ringers: 1900 mL    Oral Fluid: 240 mL  Total IN: 2140 mL    OUT:    Blood Loss (mL): 1 mL    Indwelling Catheter - Urethral (mL): 2800 mL  Total OUT: 2801 mL    Total NET: -661 mL        MEDICATIONS  (STANDING):  acetaminophen     Tablet .. 975 milliGRAM(s) Oral every 6 hours  amLODIPine   Tablet 10 milliGRAM(s) Oral daily  atorvastatin 20 milliGRAM(s) Oral at bedtime  dextrose 5%. 1000 milliLiter(s) (50 mL/Hr) IV Continuous <Continuous>  dextrose 5%. 1000 milliLiter(s) (100 mL/Hr) IV Continuous <Continuous>  dextrose 50% Injectable 25 Gram(s) IV Push once  dextrose 50% Injectable 12.5 Gram(s) IV Push once  dextrose 50% Injectable 25 Gram(s) IV Push once  enoxaparin Injectable 40 milliGRAM(s) SubCutaneous every 24 hours  glucagon  Injectable 1 milliGRAM(s) IntraMuscular once  ibuprofen  Tablet. 600 milliGRAM(s) Oral every 6 hours  insulin lispro (ADMELOG) corrective regimen sliding scale   SubCutaneous at bedtime  insulin lispro (ADMELOG) corrective regimen sliding scale   SubCutaneous three times a day before meals  lactated ringers. 1000 milliLiter(s) (75 mL/Hr) IV Continuous <Continuous>  senna 1 Tablet(s) Oral at bedtime  simethicone 80 milliGRAM(s) Chew five times a day    MEDICATIONS  (PRN):  dextrose Oral Gel 15 Gram(s) Oral once PRN Blood Glucose LESS THAN 70 milliGRAM(s)/deciliter  metoprolol tartrate Injectable 5 milliGRAM(s) IV Push every 6 hours PRN BP >160/110  ondansetron Injectable 4 milliGRAM(s) IV Push every 6 hours PRN Nausea and/or Vomiting  oxyCODONE    IR 5 milliGRAM(s) Oral every 4 hours PRN Severe Pain (7 - 10)  oxyCODONE    IR 2.5 milliGRAM(s) Oral every 4 hours PRN Moderate Pain (4 - 6)      Physical Exam:  Constitutional: WDWN female, appears stated age  Psych: NAD AxOx3  Skin: no breakdowns noted, warm and dry  Chest: s1s2+, RRR, clear to auscultation bilaterally, no w/r/r    Abdomen: softly distended, no guarding, no rebound, + bowel sounds, some tenderness to touch  Incision site: vertical incision clean and dry with dermabond tape intact.  Abdominal binder in place  Extremities: no lower extremity edema or calf tenderness bilaterally; intermittent compression stockings in place     LABS:             10.5   7.89  )-----------( 240      ( 08-26 @ 05:27 )             32.7                11.6   10.71 )-----------( 244      ( 08-25 @ 05:30 )             35.4                12.7   12.22 )-----------( 263      ( 08-24 @ 12:55 )             39.5                14.4   5.70  )-----------( 286      ( 08-24 @ 06:15 )             44.2       08-26    141    |  105    |  10     ----------------------------<  91     4.1     |  29     |  0.98   08-25    141    |  104    |  10     ----------------------------<  99     3.4<L>   |  24     |  0.95     Ca    8.8        26 Aug 2023 05:27  Ca    8.9        25 Aug 2023 05:30    Urinalysis Basic - ( 26 Aug 2023 05:27 )    Color: x / Appearance: x / SG: x / pH: x  Gluc: 91 mg/dL / Ketone: x  / Bili: x / Urobili: x   Blood: x / Protein: x / Nitrite: x   Leuk Esterase: x / RBC: x / WBC x   Sq Epi: x / Non Sq Epi: x / Bacteria: x      Finger Sticks:  POCT Blood Glucose.: 101 mg/dL (08-26 @ 07:48)  POCT Blood Glucose.: 116 mg/dL (08-25 @ 22:00)  POCT Blood Glucose.: 96 mg/dL (08-25 @ 17:00)  POCT Blood Glucose.: 91 mg/dL (08-25 @ 12:37)

## 2023-08-27 ENCOUNTER — TRANSCRIPTION ENCOUNTER (OUTPATIENT)
Age: 68
End: 2023-08-27

## 2023-08-27 ENCOUNTER — NON-APPOINTMENT (OUTPATIENT)
Age: 68
End: 2023-08-27

## 2023-08-27 VITALS
RESPIRATION RATE: 16 BRPM | OXYGEN SATURATION: 99 % | HEART RATE: 86 BPM | TEMPERATURE: 98 F | DIASTOLIC BLOOD PRESSURE: 62 MMHG | SYSTOLIC BLOOD PRESSURE: 130 MMHG

## 2023-08-27 LAB
GLUCOSE BLDC GLUCOMTR-MCNC: 138 MG/DL — HIGH (ref 70–99)
GLUCOSE BLDC GLUCOMTR-MCNC: 97 MG/DL — SIGNIFICANT CHANGE UP (ref 70–99)

## 2023-08-27 PROCEDURE — 99233 SBSQ HOSP IP/OBS HIGH 50: CPT

## 2023-08-27 RX ORDER — IBUPROFEN 200 MG
1 TABLET ORAL
Qty: 0 | Refills: 0 | DISCHARGE
Start: 2023-08-27

## 2023-08-27 RX ORDER — OXYCODONE HYDROCHLORIDE 5 MG/1
1 TABLET ORAL
Qty: 8 | Refills: 0
Start: 2023-08-27 | End: 2023-08-28

## 2023-08-27 RX ORDER — ACETAMINOPHEN 500 MG
3 TABLET ORAL
Qty: 0 | Refills: 0 | DISCHARGE
Start: 2023-08-27

## 2023-08-27 RX ORDER — SIMETHICONE 80 MG/1
1 TABLET, CHEWABLE ORAL
Qty: 0 | Refills: 0 | DISCHARGE
Start: 2023-08-27

## 2023-08-27 RX ADMIN — Medication 600 MILLIGRAM(S): at 10:23

## 2023-08-27 RX ADMIN — Medication 975 MILLIGRAM(S): at 06:02

## 2023-08-27 RX ADMIN — Medication 975 MILLIGRAM(S): at 00:46

## 2023-08-27 RX ADMIN — Medication 600 MILLIGRAM(S): at 11:20

## 2023-08-27 RX ADMIN — Medication 975 MILLIGRAM(S): at 11:30

## 2023-08-27 RX ADMIN — SIMETHICONE 80 MILLIGRAM(S): 80 TABLET, CHEWABLE ORAL at 11:30

## 2023-08-27 RX ADMIN — AMLODIPINE BESYLATE 10 MILLIGRAM(S): 2.5 TABLET ORAL at 06:02

## 2023-08-27 RX ADMIN — Medication 975 MILLIGRAM(S): at 12:03

## 2023-08-27 NOTE — DISCHARGE NOTE PROVIDER - NSDCMRMEDTOKEN_GEN_ALL_CORE_FT
acetaminophen 325 mg oral tablet: 3 tab(s) orally every 6 hours  Combigan 0.2%-0.5% ophthalmic solution: 1 drop(s) in each affected eye 2 times a day  Crestor 20 mg oral tablet: 1 tab(s) orally once a day (at bedtime)  ibuprofen 600 mg oral tablet: 1 tab(s) orally every 6 hours  Lotrel 10 mg-20 mg oral capsule: 1 cap(s) orally once a day (in the morning)  metFORMIN 1000 mg oral tablet: 2 tab(s) orally once a day (at bedtime)  Multiple Vitamins oral tablet: 1 tab(s) orally once a day LD 7/27/2023  oxyCODONE 5 mg oral tablet: 1 tab(s) orally every 6 hours as needed for Severe Pain (7 - 10) MDD: 4  simethicone 80 mg oral tablet, chewable: 1 tab(s) orally 5 times a day as needed for gas relief  Trulicity Pen 1.5 mg/0.5 mL subcutaneous solution: 1.5 milligram(s) subcutaneously once a week SATURDAY  Vitamin D: 2000 int units orally every other day

## 2023-08-27 NOTE — DISCHARGE NOTE PROVIDER - CARE PROVIDER_API CALL
Wade Ly  Gynecologic Oncology  [address] [place] [id number]  Phone: [phone]  Fax: [phone]  Established Patient  Scheduled Appointment: 09/06/2023 10:30 AM

## 2023-08-27 NOTE — PROVIDER CONTACT NOTE (OTHER) - ACTION/TREATMENT ORDERED:
MD made aware, no new orders received at this time, pt. educated on importance , risks and benefits of Am labs, plan of care ongoing .

## 2023-08-27 NOTE — PROGRESS NOTE ADULT - SUBJECTIVE AND OBJECTIVE BOX
Gyn ONC Progress Note POD#3 HD#4    Subjective:   Pt seen and examined at bedside. No events overnight. Pain well controlled. Patient ambulating. Passing flatus. Tolerating regular diet. Pt denies fever, chills, chest pain, SOB, nausea, vomiting, lightheadedness, dizziness.      Objective:  T(F): 98.2 (08-26-23 @ 21:26), Max: 98.5 (08-26-23 @ 14:00)  HR: 75 (08-26-23 @ 21:26) (66 - 93)  BP: 122/65 (08-26-23 @ 21:26) (112/61 - 135/61)  RR: 17 (08-26-23 @ 21:26) (16 - 17)  SpO2: 99% (08-26-23 @ 21:26) (98% - 100%)  Wt(kg): --  I&O's Summary    25 Aug 2023 07:01  -  26 Aug 2023 07:00  --------------------------------------------------------  IN: 2140 mL / OUT: 2801 mL / NET: -661 mL    26 Aug 2023 07:01  -  27 Aug 2023 05:35  --------------------------------------------------------  IN: 120 mL / OUT: 3200 mL / NET: -3080 mL      CAPILLARY BLOOD GLUCOSE      POCT Blood Glucose.: 131 mg/dL (26 Aug 2023 21:54)  POCT Blood Glucose.: 115 mg/dL (26 Aug 2023 17:05)  POCT Blood Glucose.: 110 mg/dL (26 Aug 2023 11:46)  POCT Blood Glucose.: 101 mg/dL (26 Aug 2023 07:48)      MEDICATIONS  (STANDING):  acetaminophen     Tablet .. 975 milliGRAM(s) Oral every 6 hours  amLODIPine   Tablet 10 milliGRAM(s) Oral daily  atorvastatin 20 milliGRAM(s) Oral at bedtime  dextrose 5%. 1000 milliLiter(s) (50 mL/Hr) IV Continuous <Continuous>  dextrose 5%. 1000 milliLiter(s) (100 mL/Hr) IV Continuous <Continuous>  dextrose 50% Injectable 25 Gram(s) IV Push once  dextrose 50% Injectable 12.5 Gram(s) IV Push once  dextrose 50% Injectable 25 Gram(s) IV Push once  enoxaparin Injectable 40 milliGRAM(s) SubCutaneous every 24 hours  glucagon  Injectable 1 milliGRAM(s) IntraMuscular once  ibuprofen  Tablet. 600 milliGRAM(s) Oral every 6 hours  insulin lispro (ADMELOG) corrective regimen sliding scale   SubCutaneous three times a day before meals  insulin lispro (ADMELOG) corrective regimen sliding scale   SubCutaneous at bedtime  senna 1 Tablet(s) Oral at bedtime  simethicone 80 milliGRAM(s) Chew five times a day    MEDICATIONS  (PRN):  dextrose Oral Gel 15 Gram(s) Oral once PRN Blood Glucose LESS THAN 70 milliGRAM(s)/deciliter  metoprolol tartrate Injectable 5 milliGRAM(s) IV Push every 6 hours PRN BP >160/110  ondansetron Injectable 4 milliGRAM(s) IV Push every 6 hours PRN Nausea and/or Vomiting  oxyCODONE    IR 5 milliGRAM(s) Oral every 4 hours PRN Severe Pain (7 - 10)  oxyCODONE    IR 2.5 milliGRAM(s) Oral every 4 hours PRN Moderate Pain (4 - 6)      Physical Exam:  Constitutional: WDWN female, appears stated age  Psych: NAD AxOx3  Skin: no breakdowns noted, warm and dry  Chest: s1s2+, RRR, clear to auscultation bilaterally, no w/r/r    Abdomen: softly distended, no guarding, no rebound, + bowel sounds, some tenderness to touch  Incision site: vertical incision clean and dry with dermabond tape intact.  Abdominal binder in place  Extremities: no lower extremity edema or calf tenderness bilaterally; intermittent compression stockings in place     LABS:    08-26    141    |  105    |  10     ----------------------------<  91     4.1     |  29     |  0.98     Ca    8.8        26 Aug 2023 05:27            Urinalysis Basic - ( 26 Aug 2023 05:27 )    Color: x / Appearance: x / SG: x / pH: x  Gluc: 91 mg/dL / Ketone: x  / Bili: x / Urobili: x   Blood: x / Protein: x / Nitrite: x   Leuk Esterase: x / RBC: x / WBC x   Sq Epi: x / Non Sq Epi: x / Bacteria: x

## 2023-08-27 NOTE — DISCHARGE NOTE NURSING/CASE MANAGEMENT/SOCIAL WORK - NSDCPEFALRISK_GEN_ALL_CORE
For information on Fall & Injury Prevention, visit: [email or web address] OR  [email or web address] OR  https://www.cdc.gov/steadi/patient.html

## 2023-08-27 NOTE — DISCHARGE NOTE NURSING/CASE MANAGEMENT/SOCIAL WORK - PATIENT PORTAL LINK FT
You can access the FollowMyHealth Patient Portal offered by [facility] by registering at the following website: [email or web address]. By joining [facility]’s FollowMyHealth portal, you will also be able to view your health information using other applications (apps) compatible with our system.

## 2023-08-27 NOTE — PROGRESS NOTE ADULT - NSPROGADDITIONALINFOA_GEN_ALL_CORE
Seen with F (AK). Labs and flow reviewed. Agree with plan. Seen on rounds with F (AK) and R (VIVEK). Labs and flow reviewed. Agree with plan.

## 2023-08-27 NOTE — PROGRESS NOTE ADULT - SUBJECTIVE AND OBJECTIVE BOX
Patient is a 68y old  Female who presents with a chief complaint of patient presented for scheduled surgery (27 Aug 2023 09:35)      SUBJECTIVE / OVERNIGHT EVENTS:  Patient has no new complaints. Denies cp, SOB, abdominal pain, N/V/D     MEDICATIONS  (STANDING):  acetaminophen     Tablet .. 975 milliGRAM(s) Oral every 6 hours  amLODIPine   Tablet 10 milliGRAM(s) Oral daily  atorvastatin 20 milliGRAM(s) Oral at bedtime  dextrose 5%. 1000 milliLiter(s) (50 mL/Hr) IV Continuous <Continuous>  dextrose 5%. 1000 milliLiter(s) (100 mL/Hr) IV Continuous <Continuous>  dextrose 50% Injectable 25 Gram(s) IV Push once  dextrose 50% Injectable 12.5 Gram(s) IV Push once  dextrose 50% Injectable 25 Gram(s) IV Push once  enoxaparin Injectable 40 milliGRAM(s) SubCutaneous every 24 hours  glucagon  Injectable 1 milliGRAM(s) IntraMuscular once  ibuprofen  Tablet. 600 milliGRAM(s) Oral every 6 hours  insulin lispro (ADMELOG) corrective regimen sliding scale   SubCutaneous three times a day before meals  insulin lispro (ADMELOG) corrective regimen sliding scale   SubCutaneous at bedtime  senna 1 Tablet(s) Oral at bedtime  simethicone 80 milliGRAM(s) Chew five times a day    MEDICATIONS  (PRN):  dextrose Oral Gel 15 Gram(s) Oral once PRN Blood Glucose LESS THAN 70 milliGRAM(s)/deciliter  metoprolol tartrate Injectable 5 milliGRAM(s) IV Push every 6 hours PRN BP >160/110  ondansetron Injectable 4 milliGRAM(s) IV Push every 6 hours PRN Nausea and/or Vomiting  oxyCODONE    IR 5 milliGRAM(s) Oral every 4 hours PRN Severe Pain (7 - 10)  oxyCODONE    IR 2.5 milliGRAM(s) Oral every 4 hours PRN Moderate Pain (4 - 6)      Vital Signs Last 24 Hrs  T(C): 36.9 (27 Aug 2023 09:46), Max: 36.9 (26 Aug 2023 14:00)  T(F): 98.5 (27 Aug 2023 09:46), Max: 98.5 (26 Aug 2023 14:00)  HR: 86 (27 Aug 2023 09:46) (72 - 93)  BP: 130/62 (27 Aug 2023 09:46) (112/61 - 135/66)  BP(mean): --  RR: 16 (27 Aug 2023 09:46) (16 - 17)  SpO2: 99% (27 Aug 2023 09:46) (99% - 100%)    Parameters below as of 27 Aug 2023 09:46  Patient On (Oxygen Delivery Method): room air      CAPILLARY BLOOD GLUCOSE      POCT Blood Glucose.: 97 mg/dL (27 Aug 2023 08:21)  POCT Blood Glucose.: 131 mg/dL (26 Aug 2023 21:54)  POCT Blood Glucose.: 115 mg/dL (26 Aug 2023 17:05)  POCT Blood Glucose.: 110 mg/dL (26 Aug 2023 11:46)    I&O's Summary    26 Aug 2023 07:01  -  27 Aug 2023 07:00  --------------------------------------------------------  IN: 360 mL / OUT: 3600 mL / NET: -3240 mL        PHYSICAL EXAM:  GENERAL: NAD, well-developed  HEAD:  Atraumatic, Normocephalic  EYES: EOMI, PERRLA, conjunctiva and sclera clear  NECK: Supple, No JVD  CHEST/LUNG: Clear to auscultation bilaterally; No wheeze  HEART: Regular rate and rhythm; No murmurs, rubs, or gallops  ABDOMEN: Soft, Nontender, Nondistended; Bowel sounds present  EXTREMITIES:  2+ Peripheral Pulses, No clubbing, cyanosis, or edema  PSYCH: AAOx3  NEUROLOGY: non-focal  SKIN: No rashes or lesions    LABS:                        10.5   7.89  )-----------( 240      ( 26 Aug 2023 05:27 )             32.7     08-26    141  |  105  |  10  ----------------------------<  91  4.1   |  29  |  0.98    Ca    8.8      26 Aug 2023 05:27            Urinalysis Basic - ( 26 Aug 2023 05:27 )    Color: x / Appearance: x / SG: x / pH: x  Gluc: 91 mg/dL / Ketone: x  / Bili: x / Urobili: x   Blood: x / Protein: x / Nitrite: x   Leuk Esterase: x / RBC: x / WBC x   Sq Epi: x / Non Sq Epi: x / Bacteria: x        RADIOLOGY & ADDITIONAL TESTS:    Imaging Personally Reviewed:    Consultant(s) Notes Reviewed:      Care Discussed with Consultants/Other Providers:

## 2023-08-27 NOTE — PROGRESS NOTE ADULT - PROBLEM SELECTOR PLAN 1
-Pain well controlled; continue management and pain control per gyn-onc recs with tylenol q6hrs, oxycodone IR prn, motrin q6hrs  -c/w bowel regimen  -c/w incentive spirometer use  -c/w PT  -passed TOV   -rest of plan gyn-onc team.

## 2023-08-27 NOTE — PROGRESS NOTE ADULT - TIME BILLING
Reviewed lab data, radiology results, consultants' recommendations, documentation in [place], discussed with family, ACP, interdisciplinary staff and/or intervention were performed.
Reviewed lab data, radiology results, consultants' recommendations, documentation in [place], discussed with family, ACP, interdisciplinary staff and/or intervention were performed.

## 2023-08-27 NOTE — PROGRESS NOTE ADULT - PROBLEM SELECTOR PLAN 4
-HgbA1c 5.9   -FS well controlled.   -Continue with ISS for now and continue to monitor FS closely.  -restart home metformin and trulicity on discharge
-HgbA1c 5.9   -FS well controlled.   -Continue with ISS for now and continue to monitor FS closely.  -holding metformin and trulicity.

## 2023-08-27 NOTE — DISCHARGE NOTE NURSING/CASE MANAGEMENT/SOCIAL WORK - NSDCPNINST_GEN_ALL_CORE
Call MD with any problems or questions. Notify MD for fever ,inability to tolerate food or liquids, or increased drainage from incisions

## 2023-08-27 NOTE — DISCHARGE NOTE PROVIDER - NSDCFUSCHEDAPPT_GEN_ALL_CORE_FT
[facility] Physician [facility]  GYNONC [address] [facility]  Scheduled Appointment: 09/06/2023    Poornima Brice  [facility] Physician [facility]  UROGYN [address]  Scheduled Appointment: 10/11/2023

## 2023-08-27 NOTE — DISCHARGE NOTE PROVIDER - PROVIDER TOKENS
PROVIDER:[TOKEN:[[id number]],SCHEDULEDAPPT:[09/06/2023],SCHEDULEDAPPTTIME:[10:30 AM],ESTABLISHEDPATIENT:[T]]

## 2023-08-27 NOTE — PROGRESS NOTE ADULT - ASSESSMENT
68y female, POD#3 s/p exploratory laparotomy, total abdominal hysterectomy, bilateral salpingooophorectomy, removal of peritoneal nodule, resection of left gluteal skin tag, cystoscopy, b/l ucaths (ebl 600) for fibroids (frozen=benign) that was uncomplicated. Patient was stable overnight and progressing well postoperatively.     Neuro: Pain well controlled on Tylenol, Motrin, Oxy PRN   - s/p b/l TAP blocks intraop   CV: Hemodynamically stable, f/u AM CBC   - H/o HTN, c/w Amlodipine, atorvastatin   Pulm: O2 sat WNL on RA, Increase ambulation, encourage incentive spirometry use   GI: Tolerating regular carb consistent diet. Zofran, senna, simethicone PRN   : Voiding spontaneously  - s/p b/l ucaths   Heme: DVT ppx: Lovenox, SCDs while in bed   ID: Afebrile, No signs of infection   Endo: h/o T2DM, c/w ISS   -GHOS: continue current management, f/u Hgb A1c, hold Metformin and trulicity   FEN: SLIV, f/u AM BMP, Replete electrolytes PRN    Dispo: discharge planning      Tangela Long, PGY2

## 2023-08-27 NOTE — DISCHARGE NOTE PROVIDER - HOSPITAL COURSE
Patient underwent an uncomplicated ex-lap, ASIA, BSO, resection of peritoneal nodule, resection of left gluteal skin tag, cystoscopy, bilateral u-cath placement, and bilateral TAP blocks (frozen=benign) for fibroids.    EBL: 300. Hct: 39.5->35.4.     POD#0 Pain was well controlled with IV Tylenol and Toradol and patient tolerated clears. POD#1 No acute events overnight. Patient was advanced to a regular diet. Patient was transitioned to oral analgesics and pain was well controlled. Pt was transitioned from HSQ to Lovenox for DVT ppx. GHOS consulted, recommended continuing ISS, Lovenox, BP meds.  POD#2 No acute events overnight. Garcia catheter was removed and pt voided spontaneously.    Upon discharge on POD#3, the patient is ambulating, voiding spontaneously, tolerating oral intake, pain was well controlled with oral medication, and vital signs were stable. Patient to have close follow up with Dr. Ly on 9/6 at 10:30am.    labs:             10.5   7.89  )-----------( 240      ( 08-26 @ 05:27 )             32.7                11.6   10.71 )-----------( 244      ( 08-25 @ 05:30 )             35.4                12.7   12.22 )-----------( 263      ( 08-24 @ 12:55 )             39.5     Vital Signs Last 24 Hrs  T(C): 36.6 (27 Aug 2023 06:00), Max: 36.9 (26 Aug 2023 14:00)  T(F): 97.8 (27 Aug 2023 06:00), Max: 98.5 (26 Aug 2023 14:00)  HR: 72 (27 Aug 2023 06:00) (72 - 93)  BP: 135/66 (27 Aug 2023 06:00) (112/61 - 135/66)  RR: 16 (27 Aug 2023 06:00) (16 - 17)  SpO2: 100% (27 Aug 2023 06:00) (99% - 100%)    Parameters below as of 26 Aug 2023 21:26  Patient On (Oxygen Delivery Method): room air

## 2023-08-27 NOTE — DISCHARGE NOTE PROVIDER - NSDCFUADDINST_GEN_ALL_CORE_FT
1. Return to your regular way of eating. Resume normal activity as tolerated, however No heavy lifting or strenuous activity for 6 weeks.  No driving for next 2 weeks and/or while on narcotic pain medication.  Complete vaginal rest, no tampons, no douching, no tub bathing, no emerging in swimming pools, no sexual activities for 6 weeks unless otherwise instructed by your doctor. Call your doctor with any signs and symptoms of infection such as fever, chills, nausea or vomiting.  Call your doctor with redness or swelling at the incision site, fluid leakage or wound separation.  Call your doctor if you're unable to tolerate food or have difficulty urinating.  Call your doctor if you have pain that is not relieved by your prescribed medications.  Notify your doctor with any other concerns.  2. Vaginal bleeding or spotting and intermittent passage of blood clots per vagina is normal in first few weeks after surgery. If you are soaking one (1) pad per hour, that is not normal and you should notify the office at [phone] and seek medical attention right away.  3. Vaginal discharge  (all colors) is normal after vaginal surgery. If you’ve had vaginal surgery, you have sutures in your vagina which take 3 months to fully absorb. You may have vaginal discharge during this time.  4. Some postoperative pain and discomfort is normal. However, if you experience any of the following, you may be developing an infection and should be seen by your doctor.  Pain that does not get better with the oral medications listed above, fever greater than 100.4F, foul smelling discharge coming from the surgical site, nausea and vomiting that does not stop (especially if you are unable to tolerate oral intake), or inability to urinate. If you experience any of these symptoms, call your doctor's office to be seen right away.  5. Please take Ibuprofen for moderate pain management. 600mg every 6 hours  6. Please take Tylenol for mild pain management 650mg every 6 hours  7. Please take oxycodone (ONE TABLET) every 6 hours for severe pain.

## 2023-08-27 NOTE — PROVIDER CONTACT NOTE (OTHER) - ASSESSMENT
pt. in no acute distress, awake, alert and oriented x4, surgical site no complication observed, voids w/o difficulty , ambulating independently,

## 2023-08-27 NOTE — PROGRESS NOTE ADULT - PROBLEM SELECTOR PLAN 2
-likely reactive secondary to post-op changes; patient with no signs of active infection and hemodynamically stable; will continue to monitor closely.
-likely reactive secondary to post-op changes; patient with no signs of active infection and hemodynamically stable

## 2023-08-27 NOTE — PROVIDER CONTACT NOTE (OTHER) - BACKGROUND
8/25 s/p TAHBSO (total abdominal hysterectomy and bilateral salpingo-oophorectomy), Open diagnostic excision of peritoneum   Excision of skin tag of vulva

## 2023-08-27 NOTE — PROGRESS NOTE ADULT - PROBLEM SELECTOR PLAN 1
GYN ONC Fellow Addendum:    Pt seen and examined at bedside. Agree with above. Pain well controlled. Tolerating reg diet. +flatus. Voiding OOB    VS reviewed  Pt refused labs this AM, previously w/ appropriate post op trend    Meeting all post op milestones  Continue current pain regimen  Tolerating reg diet  Encourage ambulation and IS use  Replete lytes prn  DVT ppx: Lovenox  Dispo: d/c tomorrow AM    Little Browning MD

## 2023-08-29 LAB — NON-GYNECOLOGICAL CYTOLOGY STUDY: SIGNIFICANT CHANGE UP

## 2023-09-01 LAB — SURGICAL PATHOLOGY STUDY: SIGNIFICANT CHANGE UP

## 2023-09-06 ENCOUNTER — APPOINTMENT (OUTPATIENT)
Dept: GYNECOLOGIC ONCOLOGY | Facility: CLINIC | Age: 68
End: 2023-09-06
Payer: COMMERCIAL

## 2023-09-06 VITALS
BODY MASS INDEX: 25.91 KG/M2 | WEIGHT: 165.4 LBS | DIASTOLIC BLOOD PRESSURE: 80 MMHG | HEART RATE: 90 BPM | SYSTOLIC BLOOD PRESSURE: 137 MMHG | TEMPERATURE: 97.7 F

## 2023-09-06 DIAGNOSIS — D21.9 BENIGN NEOPLASM OF CONNECTIVE AND OTHER SOFT TISSUE, UNSPECIFIED: ICD-10-CM

## 2023-09-06 PROCEDURE — 99024 POSTOP FOLLOW-UP VISIT: CPT

## 2023-09-06 NOTE — DISCUSSION/SUMMARY
[Clean] : was clean [Dry] : was dry [Intact] : was intact [Doing Well] : is doing well [Excellent Pain Control] : has excellent pain control [No Sign of Infection] : is showing no signs of infection [0] : 0 [Reviewed] : reviewed [FreeTextEntry1] : Healing vertical midline incision [de-identified] : cuff intact

## 2023-09-06 NOTE — PLAN
[TextEntry] : Healing well Discussed ongoing recuperation. Reviewed final pathology results in detail. A copy was given to the patient. We reviewed the need for ongoing GYN visits We reviewed recommended surveillance plan follow up with regular GYN. Patient stated and expressed a good understanding of the above information. 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

## 2023-09-06 NOTE — REASON FOR VISIT
[Post Op] : post op visit [de-identified] : 8/24/23 [de-identified] : ELAP, ASIA, BSO, excision of peritoneal lesion, excision of vulvar/gluteal skin tag [de-identified] :   She reports no vaginal bleeding, vaginal discharge or fever. She has no  concerns.

## 2023-10-11 ENCOUNTER — APPOINTMENT (OUTPATIENT)
Dept: UROGYNECOLOGY | Facility: CLINIC | Age: 68
End: 2023-10-11

## 2023-10-17 ENCOUNTER — TRANSCRIPTION ENCOUNTER (OUTPATIENT)
Age: 68
End: 2023-10-17

## 2023-10-18 ENCOUNTER — TRANSCRIPTION ENCOUNTER (OUTPATIENT)
Age: 68
End: 2023-10-18

## 2023-11-22 ENCOUNTER — APPOINTMENT (OUTPATIENT)
Dept: ORTHOPEDIC SURGERY | Facility: CLINIC | Age: 68
End: 2023-11-22

## 2024-02-13 NOTE — PROGRESS NOTE ADULT - PROBLEM SELECTOR PLAN 3
Hospitalist Discharge Summary    DISCHARGE DIAGNOSIS:    New, left frontal lobe brain tumor; possible glioblastoma   Prediabetes    HPI: 2/10/24  65 year old female without obvious pmhx, presenting as a transfer from [facility] where she presented with AMS and falls, and was found to have a new brain mass.  History obtained via iPad interpretor. Josue reports several days worth of feeling confused and intermittently dizzy, which led to several falls though without any sustained injury or ongoing pain. She presented first to [facility], where a CT of the head revealed a 4x3.2 cm left frontal mass with vasogenic edema; she was transferred to [facility] for further care and neurosurgical evaluation. This morning Josue reports no acute complaints; she denies any dizziness since arrival.     HOSPITAL COURSE:    65 year old female with     #New, left frontal lobe brain tumor; possible glioblastoma:  - Initially p/w AMS, falls, dizziness to [facility]; found to have a brain mass and transferred to [facility]  - CT head at [facility]: heterogeneous ill-defined mass centered in the inferior left frontal lobe measuring 4x3.2 with some peripheral calcification and internal blood products with extensive surrounding edema resulting in effacement of the left cerebral cortical sulci. The mass and associated vasogenic edema results in rightward midline shift and measuring approximately 8 mm. There is probable left cingulate gyrus herniation across the falx. There is hydrocephalus of the right lateral ventricle with dilation of the temporal horn of the right lateral ventricle concerning for trapped right ventricle.  - MRI brain at [facility] 2/11/24: The mass in the anterior left frontal lobe is compatible for malignancy, likely glioblastoma. Portion of the mass abuts the left side of the genu of the corpus callosum. Portion of the mass at least abuts and possibly   extends into the frontal horn of the left lateral ventricle. The mass and surrounding edema  is causing mass effect mass effect with   effacement of the overlying sulci, constriction of the frontal horn of the left lateral ventricle, and rightward deviation of the anterior portion of the septum.   - CT chest/abdomen/pelvis negative for other sign of malignancy, making the brain mass more likely to be CNS primary  - Neurosurgery on consult; case discussed with Dr. Shah, he will recommended to discharge patient home on follow-up with [facility] due to lack of insurance.  -I spoke to the patient and family at the bedside extensively today via [place] iPad .  I explained that the plan is to discharge her home today and she will go to [facility] emergency room this week even if she is feeling fine and has no issues to establish care with a neurosurgery over there to start with biopsy and follow-up management.  She verbalized understand as well as her family.  -Dr. Shah recommended Decadron 2 mg twice daily without taper dose  -Will add Protonix 40 mg daily for GI prophylaxis.     #Pre-Dm:  - Patient reports prior hx of Dm, but not actively on meds pta; used to take 1 pill, unsure of name, but maybe metformin  - A1c 6.2  - Anticipate some degree of steroid-induced hyperglycemia  - On SSI    Vitals:   Vitals:    02/13/24 1354   BP: (!) 142/89   Pulse: 65   Resp: 18   Temp: 97.3 °F (36.3 °C)       Labs:   Recent Labs   Lab 02/13/24  0440   WBC 9.5   RBC 4.22   HGB 12.5   HCT 38.2         Recent Labs   Lab 02/13/24  0746 02/11/24  0439 02/10/24  0427   SODIUM 138   < > 142   POTASSIUM 4.1   < > 3.7   CHLORIDE 102   < > 104   CO2 25   < > 28   BUN 23*   < > 11   CREATININE 0.64   < > 0.55   CALCIUM 8.9   < > 9.1   ALBUMIN  --   --  3.4*   BILIRUBIN  --   --  0.4   ALKPT  --   --  63   GPT  --   --  16   AST  --   --  10   GLUCOSE 176*   < > 158*    < > = values in this interval not displayed.          Radiology results:  CT CHEST ABDOMEN PELVIS W CONTRAST  Narrative: EXAM:   CT CHEST  ABDOMEN PELVIS W CONTRAST    CLINICAL INDICATION: Brain tumor. Staging. Follow-up    COMPARISON:  No previous exams available for review.    TECHNIQUE: Automated exposure control employed as radiation dose reduction  strategy on this patient.  Multi slice technique utilized with sagittal and  coronal reconstructions.     CONTRAST: 100 mL Omnipaque 350 contrast material administered  intravenously.    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 Ultrasound could be confirmatory. Mediastinum  and tanya without evidence of focal mass or lymphadenopathy. Lungs clear.    Abdomen/pelvis: Unremarkable liver, contracted gallbladder, pancreas,  spleen, adrenal glands, and kidneys bilaterally without evidence of focal  mass or lymphadenopathy. Appendix within normal limits.    Pelvis: No focal mass or lymphadenopathy.  Impression: Essentially unremarkable CTA chest, abdomen and pelvis without  evidence of focal mass or lymphadenopathy. 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  Ultrasound could be confirmatory.    Electronically Signed by: TAMARA TRISTAN M.D.   Signed on: 2/11/2024 2:22 PM   Workstation ID: [id number]  MRI BRAIN W WO CONTRAST  Narrative:  exam: MRI BRAIN W WO CONTRAST    CLINICAL INDICATION: Frequent falls. Abnormal finding on outside CT. Brain  mass.    COMPARISON: None at this institution    TECHNIQUE: MRI brain, routine protocol, performed without and after 6 mL IV  gadolinium contrast    FINDINGS:  Ill-defined mass in the anterior left frontal lobe. The mass is difficult  to measure due to its multilobulated appearance, up to 7 cm AP, up to 5 cm  transverse, and up to 4 cm craniocaudal. There is heterogeneous irregular  enhancing soft tissue component multiple foci of lobulated intratumoral  fluid and necrosis.  Small amount of intratumoral hemorrhage.    Posterior portion of the mass abuts focally abuts the genu of the corpus  callosum. Posterior portion of the mass at least abuts and possibly extends  into the frontal horn of the left lateral ventricle. Moderate T2/FLAIR  edema in the left frontal lobe.     The mass and edema is causing mass effect with effacement of the overlying  sulci, constriction of the left lateral ventricle, and approximately 1 cm  rightward shift of the anterior portion of the septum.    A few tiny foci of chronic microvascular ischemic demyelinization change  elsewhere in the white matter.    Unremarkable pituitary gland. Unremarkable foramen magnum. No  hydrocephalus. There is contrast opacification of superior sagittal sinus,  bilateral transverse sinuses, and bilateral sigmoid sinuses without  thrombus. Small arachnoid granulations within bilateral transverse sinuses  incidentally noted.  Impression: 1. The mass in the anterior left frontal lobe is compatible for malignancy,  likely glioblastoma. Portion of the mass abuts the left side of the genu of  the corpus callosum. Portion of the mass at least abuts and possibly  extends into the frontal horn of the left lateral ventricle.  2. The mass and surrounding edema is causing mass effect mass effect with  effacement of the overlying sulci, constriction of the frontal horn of the  left lateral ventricle, and rightward deviation of the anterior portion of  the septum.    Electronically Signed by: DANE VALENZUELA M.D.   Signed on: 2/11/2024 12:33 PM   Workstation ID: [id number]       PROCEDURE HISTORY:  None    Pending results:   None     DISCHARGE MEDICATION LIST:     Summary of your Discharge Medications        Take these Medications        Details   dexAMETHasone 4 MG tablet  Commonly known as: DECADRON   Take 0.5 tablets by mouth 2 times daily (with meals).     pantoprazole 40 MG tablet  Commonly known as: PROTONIX   [place] 1 tableta por vía  oral diariamente.  (Take 1 tablet by mouth daily.)               Allergies:   ALLERGIES:  No Known Allergies    PCP:  Pcp, No    Consultant:  Dr. Alyssa WALKER    Follow-up instructions:  Follow-up at [facility] and emergency room, to establish care with neurosurgery due to lack of insurance    I spent 45 minutes incorporating the patient discharge   continue current insulin coverage  consistent carbohydrate diet

## 2024-04-04 ENCOUNTER — APPOINTMENT (OUTPATIENT)
Dept: GERIATRICS | Facility: CLINIC | Age: 69
End: 2024-04-04
Payer: MEDICARE

## 2024-04-04 VITALS
TEMPERATURE: 97.4 F | HEART RATE: 79 BPM | SYSTOLIC BLOOD PRESSURE: 147 MMHG | DIASTOLIC BLOOD PRESSURE: 79 MMHG | OXYGEN SATURATION: 98 % | BODY MASS INDEX: 26.6 KG/M2 | WEIGHT: 169.5 LBS | HEIGHT: 67 IN

## 2024-04-04 VITALS — DIASTOLIC BLOOD PRESSURE: 75 MMHG | SYSTOLIC BLOOD PRESSURE: 141 MMHG

## 2024-04-04 DIAGNOSIS — E55.9 VITAMIN D DEFICIENCY, UNSPECIFIED: ICD-10-CM

## 2024-04-04 DIAGNOSIS — K22.70 BARRETT'S ESOPHAGUS W/OUT DYSPLASIA: ICD-10-CM

## 2024-04-04 DIAGNOSIS — D64.9 ANEMIA, UNSPECIFIED: ICD-10-CM

## 2024-04-04 DIAGNOSIS — U07.1 COVID-19: ICD-10-CM

## 2024-04-04 DIAGNOSIS — Z71.89 OTHER SPECIFIED COUNSELING: ICD-10-CM

## 2024-04-04 DIAGNOSIS — K64.8 OTHER HEMORRHOIDS: ICD-10-CM

## 2024-04-04 DIAGNOSIS — H40.1120 PRIMARY OPEN-ANGLE GLAUCOMA, LEFT EYE, STAGE UNSPECIFIED: ICD-10-CM

## 2024-04-04 DIAGNOSIS — N39.3 STRESS INCONTINENCE (FEMALE) (MALE): ICD-10-CM

## 2024-04-04 DIAGNOSIS — Z23 ENCOUNTER FOR IMMUNIZATION: ICD-10-CM

## 2024-04-04 DIAGNOSIS — N85.00 ENDOMETRIAL HYPERPLASIA, UNSPECIFIED: ICD-10-CM

## 2024-04-04 DIAGNOSIS — M16.11 UNILATERAL PRIMARY OSTEOARTHRITIS, RIGHT HIP: ICD-10-CM

## 2024-04-04 DIAGNOSIS — Z86.018 PERSONAL HISTORY OF OTHER BENIGN NEOPLASM: ICD-10-CM

## 2024-04-04 PROCEDURE — G0009: CPT

## 2024-04-04 PROCEDURE — 90677 PCV20 VACCINE IM: CPT

## 2024-04-04 PROCEDURE — 99497 ADVNCD CARE PLAN 30 MIN: CPT | Mod: 25

## 2024-04-04 PROCEDURE — 99205 OFFICE O/P NEW HI 60 MIN: CPT | Mod: 25

## 2024-04-04 RX ORDER — ASPIRIN 81 MG/1
81 TABLET, COATED ORAL
Qty: 90 | Refills: 0 | Status: DISCONTINUED | COMMUNITY
Start: 2022-11-01 | End: 2024-04-04

## 2024-04-04 RX ORDER — METFORMIN HYDROCHLORIDE 1000 MG/1
1000 TABLET, FILM COATED, EXTENDED RELEASE ORAL
Qty: 90 | Refills: 0 | Status: DISCONTINUED | COMMUNITY
Start: 2019-12-19 | End: 2024-04-04

## 2024-04-04 RX ORDER — METFORMIN HYDROCHLORIDE 625 MG/1
TABLET ORAL
Refills: 0 | Status: DISCONTINUED | COMMUNITY
End: 2024-04-04

## 2024-04-06 PROBLEM — K22.70 BARRETT ESOPHAGUS: Status: ACTIVE | Noted: 2024-04-06

## 2024-04-06 PROBLEM — E55.9 VITAMIN D DEFICIENCY: Status: ACTIVE | Noted: 2024-04-04

## 2024-04-06 PROBLEM — Z71.89 ADVANCE CARE PLANNING: Status: ACTIVE | Noted: 2024-04-06

## 2024-04-06 PROBLEM — Z23 ENCOUNTER FOR IMMUNIZATION: Status: ACTIVE | Noted: 2021-05-12

## 2024-04-06 PROBLEM — H40.1120: Status: ACTIVE | Noted: 2024-04-06

## 2024-04-06 PROBLEM — D64.9 ANEMIA, UNSPECIFIED TYPE: Status: ACTIVE | Noted: 2024-04-04

## 2024-04-06 PROBLEM — N39.3 SUI (STRESS URINARY INCONTINENCE, FEMALE): Status: ACTIVE | Noted: 2023-01-30

## 2024-04-06 NOTE — HISTORY OF PRESENT ILLNESS
[Patient reported osteoporosis screening was normal] : Patient reported osteoporosis screening was normal [Patient reported vision is abnormal] : Patient reported vision is abnormal [Patient reported colon/rectal cancer screening was abnormal] : Patient reported colon/rectal cancer screening was abnormal [Patient reported breast cancer screening was normal] : Patient reported breast cancer screening was normal [Patient reported cervical cancer screening was normal] : Patient reported cervical cancer screening was normal [One fall no injury in past year] : Patient reported one fall in the past year without injury [Smoke Detector] : smoke detector [Carbon Monoxide Detector] : carbon monoxide detector [Wears Seat Belt] : wears seat belt [Wears Sunscreen] : wears sunscreen [Little interest or pleasure doing things] : 1) Little interest or pleasure doing things [Feeling down, depressed, or hopeless] : 2) Feeling down, depressed, or hopeless [0] : 2) Feeling down, depressed, or hopeless: Not at all (0) [PHQ-2 Negative - No further assessment needed] : PHQ-2 Negative - No further assessment needed [With Patient/Caregiver] : , with patient/caregiver [Reviewed no changes] : Reviewed, no changes [Aggressive treatment] : aggressive treatment [I will adhere to the patient's wishes.] : I will adhere to the patient's wishes. [Time Spent: ___ minutes] : Time Spent: [unfilled] minutes [Patient reported hearing was normal] : Patient reported hearing was normal [Patient reported Patient reported dental screening is normal] : Patient reported dental screening is normal [Patient reported skin cancer screening was normal] : Patient reported skin cancer screening was normal [Patient is independent with] : bathing [Completely Independent] : Completely independent. [] : managing medications [Independent] : managing finances [FreeTextEntry1] : 67 y/o F with PMHx of R knee OA, R hip OA, HTN, b/l renal cysts, DM, stress incontinence, uterine fibroids, glaucoma, HLD, low vitamin D, thickened endometrium with post-menopausal bleeding presenting to the practice to establish care.  Specialists: Gynonc: Dr. Elizabeth Morales, Dr. Wade Ly Urology: Dr. Tigist Story Urogyn: Dr. Poornima Brice Ortho: Dr. James Franklin, Dr. Moise Tompkins Endo: Dr. Ema Wynn GI: Dr. Jet Kruse  Pharmacy: [address] Meds: Metformin 1000 mg BID X Combigan solution 1 drop in L eye daily ASA 81 mg daily X Amlodipine-Benazepril 10-20 mg daily Rosuvastatin 20 mg QHS Trulicity 4.5 mg 1 injection weekly Vitamin D 50 mcg daily   Interim: Post-menopausal bleeding: Follows gynonc. Had history of uterine fibroids. TVUS in 2023 showed enlarged fibroid uterius, EMBx was benign. Pelvic MRI confirmed multiple fibroids and thickened EML 7 mm. S/p ELAP, ASIA, BSO, excision of peritoneal lesion, excision of vulvar/gluteal skin tag.  concerns resolved following operation. Biopsies were negative for malignancies. Required urinary caths for procedure done with urology. Paps negative. Mammos negative from 2021. S/p breast bx many years ago which was negative. DEXA from 2021 was negative.  Urinary incontinence: Follows urogyn and urology. Stress incontinence which is mild and is occasional. Has nocturia 3+ times a night. Not using pads anymore following surgery. No urgency noted.  R hip pain: Follows with ortho. Following a mechanical fall in [place] in 2022. S/p acupuncture which has helped. X-rays are unremarkable., Had MRI of hip done to r/o labral tear. Nonspecific marrow edema in the right greater trochanter measuring 3 cm could represent a nondisplaced avulsion fracture with strain of the distal right gluteal tendon insertion posteriorly and mild surrounding bursitis and soft tissue swelling without displaced bone fragment or tendon discontinuity. Clinical correlation and follow up is recommended. Undersurface tearing of the anterior and superior labrum with slight effusion and synovitis in the anterior superior right hip joint. Was referred to PT and given Meloxicam.  DM: Used to follow endocrine. Off Metformin, now on Trulicity. Check blood sugars daily ranging in low 100's. Sees eye doctor annually. Last A1c on file from 2021 was 6.8%.  HTN: EKG from 2021 is normal. Had holter done in 2020 which was fine. On Amlodipine-Benazapril. BP not checked at home.  GERD: S/p EGD in 2023, found Barretts, has a hiatal hernia and non-specific gastritis.  Hemorrhoids: S/p colonoscopy in 2023, found on scope. No pain or bleeding noted. Colon showed sessile polyp 1.00 cm s/p polypectomy. Mild diverticulosis in sigmoid and descending colons. Follow up scope in 2028.  Glaucoma: Takes Combigan drops for L eye, genetic. No vision change otherwise. S/p cataract repair in R eye in 2023.   Screening: Colon: benign polyp in 2023, repeat in 5 years Skin: denies Pulm: denies Cards: HTN CVA: parents had CVA DM: as noted Gyn: uterine fibroids s/p surgical removal of uterus, mammos and paps are normal Cancers: sister passed away from lung cancer Hearing: denies Eye: glaucoma on drops for L eye, h/o cataract repair in R eye in 2023 Dental: denies Osteoporosis: DEXA normal in 2021  Immunizations: COVID: S/p 3 in 2021 PCV: Due Flu: annually in October, did not get last year RSV: defer Shingrix: defer  Mental: Depression: denies Anxiety: denies SI/SA: denies  Social: Smoking: denies EtOH: denies Illicit drugs: denies Family: lives with daughter and grandson in a home, 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  ~~~~~~~~~~~~~~~~~~~~~~~~~  Geriatric Assessment:  4M's: Mobility: 1 fall as noted Mentation: denies, father had dementia Meds: optimized Matters: full code  Frailty: Weakness: denies Gait speed: denies Weight loss: denies Falls: 1 fall 1 year ago Wounds: denies Appetite: intact [TextBox_25] : H/o osteopenia which is resolved [TextBox_37] : Glaucoma in L eye, cataract s/p repair in R eye [TextBox_19] : Benign polyp from 2023, repeat in 5 years [Guns at Home] : no guns at home [Stair Lift] : no stair lift used in home [Grab Bars] : no grab bars [Shower Chair] : no shower chair [Night Light] : no night light [Anti-Slip Measures] : no anti-slip measures [Driving Concerns] : not driving or driving without noted concerns [ANL1Dhusn] : 0 [AdvancecareDate] : 04/04/2024 [FreeTextEntry4] : Full code, has HCP form at home will scan to us, Will discuss MOLST next time

## 2024-04-09 ENCOUNTER — NON-APPOINTMENT (OUTPATIENT)
Age: 69
End: 2024-04-09

## 2024-04-09 LAB
ALBUMIN SERPL ELPH-MCNC: 4.6 G/DL
ALP BLD-CCNC: 82 U/L
ALT SERPL-CCNC: 24 U/L
ANION GAP SERPL CALC-SCNC: 16 MMOL/L
AST SERPL-CCNC: 15 U/L
BASOPHILS # BLD AUTO: 0.05 K/UL
BASOPHILS NFR BLD AUTO: 0.9 %
BILIRUB SERPL-MCNC: 0.2 MG/DL
BUN SERPL-MCNC: 12 MG/DL
CALCIUM SERPL-MCNC: 9.9 MG/DL
CHLORIDE SERPL-SCNC: 100 MMOL/L
CHOLEST SERPL-MCNC: 214 MG/DL
CO2 SERPL-SCNC: 23 MMOL/L
CREAT SERPL-MCNC: 0.87 MG/DL
CREAT SPEC-SCNC: 180 MG/DL
EGFR: 73 ML/MIN/1.73M2
EOSINOPHIL # BLD AUTO: 0.36 K/UL
EOSINOPHIL NFR BLD AUTO: 6.8 %
ESTIMATED AVERAGE GLUCOSE: 131 MG/DL
FOLATE SERPL-MCNC: 11.4 NG/ML
GLUCOSE SERPL-MCNC: 97 MG/DL
HBA1C MFR BLD HPLC: 6.2 %
HCT VFR BLD CALC: 41.3 %
HDLC SERPL-MCNC: 70 MG/DL
HGB BLD-MCNC: 13.2 G/DL
IMM GRANULOCYTES NFR BLD AUTO: 0.2 %
LDLC SERPL CALC-MCNC: 112 MG/DL
LYMPHOCYTES # BLD AUTO: 2.78 K/UL
LYMPHOCYTES NFR BLD AUTO: 52.6 %
MAN DIFF?: NORMAL
MCHC RBC-ENTMCNC: 28.4 PG
MCHC RBC-ENTMCNC: 32 GM/DL
MCV RBC AUTO: 89 FL
MICROALBUMIN 24H UR DL<=1MG/L-MCNC: 2.1 MG/DL
MICROALBUMIN/CREAT 24H UR-RTO: 12 MG/G
MONOCYTES # BLD AUTO: 0.4 K/UL
MONOCYTES NFR BLD AUTO: 7.6 %
NEUTROPHILS # BLD AUTO: 1.69 K/UL
NEUTROPHILS NFR BLD AUTO: 31.9 %
NONHDLC SERPL-MCNC: 144 MG/DL
PLATELET # BLD AUTO: 335 K/UL
POTASSIUM SERPL-SCNC: 4 MMOL/L
PROT SERPL-MCNC: 7.8 G/DL
RBC # BLD: 4.64 M/UL
RBC # FLD: 13.2 %
SODIUM SERPL-SCNC: 138 MMOL/L
TRIGL SERPL-MCNC: 189 MG/DL
TSH SERPL-ACNC: 1.04 UIU/ML
VIT B12 SERPL-MCNC: 641 PG/ML
WBC # FLD AUTO: 5.29 K/UL

## 2024-04-09 RX ORDER — DULAGLUTIDE 4.5 MG/.5ML
4.5 INJECTION, SOLUTION SUBCUTANEOUS
Qty: 1 | Refills: 2 | Status: ACTIVE | COMMUNITY
Start: 2024-04-04 | End: 1900-01-01

## 2024-04-09 RX ORDER — MULTIVIT-MIN/IRON/FOLIC ACID/K 18-600-40
50 MCG CAPSULE ORAL
Qty: 90 | Refills: 0 | Status: ACTIVE | COMMUNITY
Start: 2024-04-04 | End: 1900-01-01

## 2024-04-09 RX ORDER — ROSUVASTATIN CALCIUM 20 MG/1
20 TABLET, FILM COATED ORAL
Qty: 90 | Refills: 0 | Status: ACTIVE | COMMUNITY
Start: 2024-04-04 | End: 1900-01-01

## 2024-04-15 ENCOUNTER — APPOINTMENT (OUTPATIENT)
Dept: RADIOLOGY | Facility: CLINIC | Age: 69
End: 2024-04-15
Payer: MEDICARE

## 2024-04-15 ENCOUNTER — OUTPATIENT (OUTPATIENT)
Dept: OUTPATIENT SERVICES | Facility: HOSPITAL | Age: 69
LOS: 1 days | End: 2024-04-15
Payer: MEDICARE

## 2024-04-15 DIAGNOSIS — Z98.890 OTHER SPECIFIED POSTPROCEDURAL STATES: Chronic | ICD-10-CM

## 2024-04-15 DIAGNOSIS — Z98.49 CATARACT EXTRACTION STATUS, UNSPECIFIED EYE: Chronic | ICD-10-CM

## 2024-04-15 DIAGNOSIS — M85.89 OTHER SPECIFIED DISORDERS OF BONE DENSITY AND STRUCTURE, MULTIPLE SITES: ICD-10-CM

## 2024-04-15 PROCEDURE — 77080 DXA BONE DENSITY AXIAL: CPT | Mod: 26

## 2024-04-15 PROCEDURE — 77080 DXA BONE DENSITY AXIAL: CPT

## 2024-04-16 ENCOUNTER — APPOINTMENT (OUTPATIENT)
Dept: GERIATRICS | Facility: CLINIC | Age: 69
End: 2024-04-16
Payer: MEDICARE

## 2024-04-16 DIAGNOSIS — M85.89 OTHER SPECIFIED DISORDERS OF BONE DENSITY AND STRUCTURE, MULTIPLE SITES: ICD-10-CM

## 2024-04-16 DIAGNOSIS — M54.50 LOW BACK PAIN, UNSPECIFIED: ICD-10-CM

## 2024-04-16 DIAGNOSIS — Z13.820 ENCOUNTER FOR SCREENING FOR OSTEOPOROSIS: ICD-10-CM

## 2024-04-16 DIAGNOSIS — R79.89 OTHER SPECIFIED ABNORMAL FINDINGS OF BLOOD CHEMISTRY: ICD-10-CM

## 2024-04-16 PROCEDURE — G2211 COMPLEX E/M VISIT ADD ON: CPT | Mod: NC,1L

## 2024-04-16 PROCEDURE — 99442: CPT

## 2024-04-20 PROBLEM — Z13.820 SCREENING FOR OSTEOPOROSIS: Status: ACTIVE | Noted: 2024-04-20

## 2024-04-20 PROBLEM — M54.50 LOW BACK PAIN: Status: ACTIVE | Noted: 2023-01-30

## 2024-04-20 PROBLEM — M85.89 OSTEOPENIA OF MULTIPLE SITES: Status: ACTIVE | Noted: 2021-05-12

## 2024-05-02 ENCOUNTER — TRANSCRIPTION ENCOUNTER (OUTPATIENT)
Age: 69
End: 2024-05-02

## 2024-05-03 ENCOUNTER — APPOINTMENT (OUTPATIENT)
Dept: GERIATRICS | Facility: CLINIC | Age: 69
End: 2024-05-03
Payer: MEDICARE

## 2024-05-03 VITALS
SYSTOLIC BLOOD PRESSURE: 143 MMHG | OXYGEN SATURATION: 99 % | RESPIRATION RATE: 14 BRPM | HEART RATE: 80 BPM | TEMPERATURE: 98.1 F | DIASTOLIC BLOOD PRESSURE: 82 MMHG | BODY MASS INDEX: 26.31 KG/M2 | WEIGHT: 168 LBS

## 2024-05-03 DIAGNOSIS — I15.2 TYPE 2 DIABETES MELLITUS WITH OTHER CIRCULATORY COMPLICATIONS: ICD-10-CM

## 2024-05-03 DIAGNOSIS — J30.2 OTHER SEASONAL ALLERGIC RHINITIS: ICD-10-CM

## 2024-05-03 DIAGNOSIS — Z79.4 TYPE 2 DIABETES MELLITUS W/OUT COMPLICATIONS: ICD-10-CM

## 2024-05-03 DIAGNOSIS — E78.5 TYPE 2 DIABETES MELLITUS WITH OTHER SPECIFIED COMPLICATION: ICD-10-CM

## 2024-05-03 DIAGNOSIS — E11.59 TYPE 2 DIABETES MELLITUS WITH OTHER CIRCULATORY COMPLICATIONS: ICD-10-CM

## 2024-05-03 DIAGNOSIS — E11.69 TYPE 2 DIABETES MELLITUS WITH OTHER SPECIFIED COMPLICATION: ICD-10-CM

## 2024-05-03 DIAGNOSIS — E11.9 TYPE 2 DIABETES MELLITUS W/OUT COMPLICATIONS: ICD-10-CM

## 2024-05-03 PROCEDURE — 99214 OFFICE O/P EST MOD 30 MIN: CPT

## 2024-05-03 PROCEDURE — G2211 COMPLEX E/M VISIT ADD ON: CPT

## 2024-05-03 RX ORDER — METFORMIN HYDROCHLORIDE 1000 MG/1
1000 TABLET, COATED ORAL DAILY
Qty: 90 | Refills: 0 | Status: ACTIVE | COMMUNITY
Start: 2024-05-03 | End: 1900-01-01

## 2024-05-06 VITALS — DIASTOLIC BLOOD PRESSURE: 80 MMHG | SYSTOLIC BLOOD PRESSURE: 138 MMHG

## 2024-05-06 PROBLEM — J30.2 SEASONAL ALLERGIC RHINITIS, UNSPECIFIED TRIGGER: Status: ACTIVE | Noted: 2024-05-03

## 2024-05-06 PROBLEM — E11.69 HYPERLIPIDEMIA ASSOCIATED WITH TYPE 2 DIABETES MELLITUS: Status: ACTIVE | Noted: 2024-04-06

## 2024-05-06 PROBLEM — E11.59 HYPERTENSION ASSOCIATED WITH TYPE 2 DIABETES MELLITUS: Status: ACTIVE | Noted: 2024-04-06

## 2024-05-06 PROBLEM — E11.9 TYPE 2 DIABETES MELLITUS WITHOUT COMPLICATION, WITH LONG-TERM CURRENT USE OF INSULIN: Status: ACTIVE | Noted: 2024-04-06

## 2024-05-06 NOTE — ASSESSMENT
[FreeTextEntry1] : RTC in 3 months for follow up visit.  Total time spent: 30 mins This includes chart review, patient assessment, discussion and collaboration with interdisciplinary team members, excluding time spent on separately billable services.

## 2024-05-06 NOTE — HISTORY OF PRESENT ILLNESS
[One fall no injury in past year] : Patient reported one fall in the past year without injury [Patient is independent with] : bathing [Completely Independent] : Completely independent. [] : managing medications [Independent] : managing finances [Smoke Detector] : smoke detector [Carbon Monoxide Detector] : carbon monoxide detector [Wears Seat Belt] : wears seat belt [Wears Sunscreen] : wears sunscreen [Little interest or pleasure doing things] : 1) Little interest or pleasure doing things [Feeling down, depressed, or hopeless] : 2) Feeling down, depressed, or hopeless [0] : 2) Feeling down, depressed, or hopeless: Not at all (0) [PHQ-2 Negative - No further assessment needed] : PHQ-2 Negative - No further assessment needed [FreeTextEntry1] : 67 y/o F with PMHx of R knee OA, R hip OA, HTN, b/l renal cysts, DM, stress incontinence, uterine fibroids, glaucoma, HLD, low vitamin D, thickened endometrium with post-menopausal bleeding presenting to the practice for a follow up visit.  Viral vs allergic URI: Since 1 week, since onset of warmer weather. Has a dry cough, feels like she has some chest congestion and nasal/sinus congestion as well. Eyes are watery too. No chest pain, palpitations, SOB, fevers, chills noted. Takes allegra daily but has had some relief only.  Urinary incontinence: Follows urogyn and urology. Stress incontinence which is mild and is occasional. Has nocturia 3+ times a night. Not using pads anymore following surgery. No urgency noted.  DM: Used to follow endocrine. Off Metformin, now on Trulicity. Check blood sugars daily ranging in low 100's. Sees eye doctor annually. Last A1c on file from 2021 was 6.8%. Trulicity on backorder at pharmacy, has enough for a week. Sugars are okay.  HTN: EKG from 2021 is normal. Had holter done in 2020 which was fine. On Amlodipine-Benazapril. BP not checked at home. Takes meds in the evening only. BP ranging in the 140's systolic with me.  GERD: S/p EGD in 2023, found Barretts, has a hiatal hernia and non-specific gastritis. Stable. [Guns at Home] : no guns at home [Stair Lift] : no stair lift used in home [Grab Bars] : no grab bars [Shower Chair] : no shower chair [Night Light] : no night light [Anti-Slip Measures] : no anti-slip measures [Driving Concerns] : not driving or driving without noted concerns [[id number]] : 0

## 2024-05-06 NOTE — PHYSICAL EXAM
[Alert] : alert [No Acute Distress] : in no acute distress [Normal Outer Ear/Nose] : the ears and nose were normal in appearance [Normal Appearance] : the appearance of the neck was normal [Supple] : the neck was supple [No Respiratory Distress] : no respiratory distress [No Acc Muscle Use] : no accessory muscle use [Respiration, Rhythm And Depth] : normal respiratory rhythm and effort [Auscultation Breath Sounds / Voice Sounds] : lungs were clear to auscultation bilaterally [Heart Rate And Rhythm] : heart rate was normal and rhythm regular [Bowel Sounds] : normal bowel sounds [Abdomen Tenderness] : non-tender [Abdomen Soft] : soft [No Spinal Tenderness] : no spinal tenderness [Normal Color / Pigmentation] : normal skin color and pigmentation [Normal Turgor] : normal skin turgor [No Focal Deficits] : no focal deficits [Normal Affect] : the affect was normal [Normal Mood] : the mood was normal [de-identified] : B/l inflamed nasal turbinates w/o obvious nasal polyps or foreign body. Posterior pharynx with some mild redness and post-nasal drip, no tonsillar enlargement/exudate

## 2024-06-29 ENCOUNTER — RX RENEWAL (OUTPATIENT)
Age: 69
End: 2024-06-29

## 2024-07-08 ENCOUNTER — RX RENEWAL (OUTPATIENT)
Age: 69
End: 2024-07-08

## 2024-07-29 ENCOUNTER — RX RENEWAL (OUTPATIENT)
Age: 69
End: 2024-07-29

## 2024-08-12 ENCOUNTER — APPOINTMENT (OUTPATIENT)
Dept: GERIATRICS | Facility: CLINIC | Age: 69
End: 2024-08-12
Payer: MEDICARE

## 2024-08-12 VITALS
SYSTOLIC BLOOD PRESSURE: 117 MMHG | BODY MASS INDEX: 26.31 KG/M2 | HEART RATE: 86 BPM | OXYGEN SATURATION: 97 % | TEMPERATURE: 98 F | DIASTOLIC BLOOD PRESSURE: 72 MMHG | WEIGHT: 168 LBS | RESPIRATION RATE: 14 BRPM

## 2024-08-12 DIAGNOSIS — E55.9 VITAMIN D DEFICIENCY, UNSPECIFIED: ICD-10-CM

## 2024-08-12 PROCEDURE — G2211 COMPLEX E/M VISIT ADD ON: CPT

## 2024-08-12 PROCEDURE — 99214 OFFICE O/P EST MOD 30 MIN: CPT

## 2024-08-12 RX ORDER — DULAGLUTIDE 3 MG/.5ML
3 INJECTION, SOLUTION SUBCUTANEOUS
Qty: 1 | Refills: 2 | Status: ACTIVE | COMMUNITY
Start: 2024-08-12 | End: 1900-01-01

## 2024-08-12 NOTE — ASSESSMENT
[FreeTextEntry1] : RTC in 3 months for annual wellness exam.  Total time spent: 30 mins This includes chart review, patient assessment, discussion and collaboration with interdisciplinary team members, excluding time spent on separately billable services.

## 2024-08-12 NOTE — HISTORY OF PRESENT ILLNESS
[One fall no injury in past year] : Patient reported one fall in the past year without injury [Patient is independent with] : bathing [Completely Independent] : Completely independent. [] : managing medications [Independent] : managing finances [Smoke Detector] : smoke detector [Carbon Monoxide Detector] : carbon monoxide detector [Wears Seat Belt] : wears seat belt [Wears Sunscreen] : wears sunscreen [NO] : No [Little interest or pleasure doing things] : 1) Little interest or pleasure doing things [Feeling down, depressed, or hopeless] : 2) Feeling down, depressed, or hopeless [0] : 2) Feeling down, depressed, or hopeless: Not at all (0) [PHQ-2 Negative - No further assessment needed] : PHQ-2 Negative - No further assessment needed [FreeTextEntry1] : 67 y/o F with PMHx of R knee OA, R hip OA, HTN, b/l renal cysts, DM, stress incontinence, uterine fibroids, glaucoma, HLD, low vitamin D, thickened endometrium with post-menopausal bleeding presenting to the practice for a follow up visit.  Urinary incontinence: Follows urogyn and urology. Stress incontinence which is mild and is occasional. Has nocturia 3+ times a night. Not using pads anymore following surgery. No urgency noted.  DM: Used to follow endocrine. Off Metformin, now on Trulicity. Check blood sugars daily ranging in low 100's. Sees eye doctor annually. Last A1c on file from 2021 was 6.8%. Trulicity on backorder at pharmacy, has enough for a week. Sugars are okay. I ordered metformin 1000 mg daily to hold her over until today's visit. A1c 6.2% with me at last visit.  Trulicity 3 needs 90 day supply.  HTN: EKG from 2021 is normal. Had holter done in 2020 which was fine. On Amlodipine-Benazapril. BP not checked at home. Takes meds in the evening only. BP ranging in the 140's systolic with me.  HLD: , total 214, . On crestor 20 mg.  GERD: S/p EGD in 2023, found Barretts, has a hiatal hernia and non-specific gastritis. Stable.  Seasonal allergies: I started her on allegra and azelastine drops for eye and nose as needed for recurrent allergy sx. No SOB or chest pain noted. Was getting over a cold at previous visit. [Stair Lift] : no stair lift used in home [Grab Bars] : no grab bars [Shower Chair] : no shower chair [Night Light] : no night light [Anti-Slip Measures] : no anti-slip measures [Driving Concerns] : not driving or driving without noted concerns [[id number]] : 0

## 2024-08-12 NOTE — HISTORY OF PRESENT ILLNESS
[One fall no injury in past year] : Patient reported one fall in the past year without injury [Patient is independent with] : bathing [Completely Independent] : Completely independent. [] : managing medications [Independent] : managing finances [Smoke Detector] : smoke detector [Carbon Monoxide Detector] : carbon monoxide detector [Wears Seat Belt] : wears seat belt [Wears Sunscreen] : wears sunscreen [NO] : No [Little interest or pleasure doing things] : 1) Little interest or pleasure doing things [Feeling down, depressed, or hopeless] : 2) Feeling down, depressed, or hopeless [0] : 2) Feeling down, depressed, or hopeless: Not at all (0) [PHQ-2 Negative - No further assessment needed] : PHQ-2 Negative - No further assessment needed [FreeTextEntry1] : 67 y/o F with PMHx of R knee OA, R hip OA, HTN, b/l renal cysts, DM, stress incontinence, uterine fibroids, glaucoma, HLD, low vitamin D, thickened endometrium with post-menopausal bleeding presenting to the practice for a follow up visit.  Urinary incontinence: Follows urogyn and urology. Stress incontinence which is mild and is occasional. Has nocturia 3+ times a night. Not using pads anymore following surgery. No urgency noted.  DM: Used to follow endocrine. Off Metformin, now on Trulicity. Check blood sugars daily ranging in low 100's. Sees eye doctor annually. Last A1c on file from 2021 was 6.8%. Trulicity on backorder at pharmacy, has enough for a week. Sugars are okay. I ordered metformin 1000 mg daily to hold her over until today's visit. A1c 6.2% with me at last visit.  Trulicity 3 needs 90 day supply.  HTN: EKG from 2021 is normal. Had holter done in 2020 which was fine. On Amlodipine-Benazapril. BP not checked at home. Takes meds in the evening only. BP ranging in the 140's systolic with me.  HLD: , total 214, . On crestor 20 mg.  GERD: S/p EGD in 2023, found Barretts, has a hiatal hernia and non-specific gastritis. Stable.  Seasonal allergies: I started her on allegra and azelastine drops for eye and nose as needed for recurrent allergy sx. No SOB or chest pain noted. Was getting over a cold at previous visit. [Stair Lift] : no stair lift used in home [Grab Bars] : no grab bars [Shower Chair] : no shower chair [Night Light] : no night light [Anti-Slip Measures] : no anti-slip measures [Driving Concerns] : not driving or driving without noted concerns [[id number]] : 0

## 2024-08-13 LAB
25(OH)D3 SERPL-MCNC: 42.6 NG/ML
ALBUMIN SERPL ELPH-MCNC: 4.7 G/DL
ALP BLD-CCNC: 79 U/L
ALT SERPL-CCNC: 22 U/L
ANION GAP SERPL CALC-SCNC: 15 MMOL/L
AST SERPL-CCNC: 19 U/L
BASOPHILS # BLD AUTO: 0.05 K/UL
BASOPHILS NFR BLD AUTO: 0.9 %
BILIRUB SERPL-MCNC: 0.2 MG/DL
BUN SERPL-MCNC: 11 MG/DL
CALCIUM SERPL-MCNC: 9.9 MG/DL
CHLORIDE SERPL-SCNC: 105 MMOL/L
CHOLEST SERPL-MCNC: 167 MG/DL
CO2 SERPL-SCNC: 22 MMOL/L
CREAT SERPL-MCNC: 1.02 MG/DL
EGFR: 60 ML/MIN/1.73M2
EOSINOPHIL # BLD AUTO: 0.29 K/UL
EOSINOPHIL NFR BLD AUTO: 5.4 %
ESTIMATED AVERAGE GLUCOSE: 151 MG/DL
GLUCOSE SERPL-MCNC: 128 MG/DL
HBA1C MFR BLD HPLC: 6.9 %
HCT VFR BLD CALC: 40.2 %
HDLC SERPL-MCNC: 76 MG/DL
HGB BLD-MCNC: 12.7 G/DL
IMM GRANULOCYTES NFR BLD AUTO: 0.2 %
LDLC SERPL CALC-MCNC: 79 MG/DL
LYMPHOCYTES # BLD AUTO: 2.82 K/UL
LYMPHOCYTES NFR BLD AUTO: 52.2 %
MAN DIFF?: NORMAL
MCHC RBC-ENTMCNC: 28.4 PG
MCHC RBC-ENTMCNC: 31.6 GM/DL
MCV RBC AUTO: 89.9 FL
MONOCYTES # BLD AUTO: 0.3 K/UL
MONOCYTES NFR BLD AUTO: 5.6 %
NEUTROPHILS # BLD AUTO: 1.93 K/UL
NEUTROPHILS NFR BLD AUTO: 35.7 %
NONHDLC SERPL-MCNC: 91 MG/DL
PLATELET # BLD AUTO: 290 K/UL
POTASSIUM SERPL-SCNC: 4.4 MMOL/L
PROT SERPL-MCNC: 7.8 G/DL
RBC # BLD: 4.47 M/UL
RBC # FLD: 13.3 %
SODIUM SERPL-SCNC: 141 MMOL/L
TRIGL SERPL-MCNC: 64 MG/DL
WBC # FLD AUTO: 5.4 K/UL

## 2024-08-23 ENCOUNTER — APPOINTMENT (OUTPATIENT)
Dept: GERIATRICS | Facility: CLINIC | Age: 69
End: 2024-08-23
Payer: MEDICARE

## 2024-08-23 VITALS
HEART RATE: 80 BPM | BODY MASS INDEX: 26.53 KG/M2 | OXYGEN SATURATION: 99 % | HEIGHT: 67 IN | DIASTOLIC BLOOD PRESSURE: 78 MMHG | TEMPERATURE: 97.5 F | WEIGHT: 169 LBS | SYSTOLIC BLOOD PRESSURE: 130 MMHG

## 2024-08-23 DIAGNOSIS — E11.69 TYPE 2 DIABETES MELLITUS WITH OTHER SPECIFIED COMPLICATION: ICD-10-CM

## 2024-08-23 DIAGNOSIS — E11.9 TYPE 2 DIABETES MELLITUS W/OUT COMPLICATIONS: ICD-10-CM

## 2024-08-23 DIAGNOSIS — E11.59 TYPE 2 DIABETES MELLITUS WITH OTHER CIRCULATORY COMPLICATIONS: ICD-10-CM

## 2024-08-23 DIAGNOSIS — Z79.4 TYPE 2 DIABETES MELLITUS W/OUT COMPLICATIONS: ICD-10-CM

## 2024-08-23 DIAGNOSIS — I15.2 TYPE 2 DIABETES MELLITUS WITH OTHER CIRCULATORY COMPLICATIONS: ICD-10-CM

## 2024-08-23 DIAGNOSIS — E78.5 TYPE 2 DIABETES MELLITUS WITH OTHER SPECIFIED COMPLICATION: ICD-10-CM

## 2024-08-23 DIAGNOSIS — M25.512 PAIN IN LEFT SHOULDER: ICD-10-CM

## 2024-08-23 PROCEDURE — G2211 COMPLEX E/M VISIT ADD ON: CPT

## 2024-08-23 PROCEDURE — 99214 OFFICE O/P EST MOD 30 MIN: CPT

## 2024-08-23 RX ORDER — MELOXICAM 7.5 MG/1
7.5 TABLET ORAL
Qty: 60 | Refills: 0 | Status: ACTIVE | COMMUNITY
Start: 2024-08-23 | End: 1900-01-01

## 2024-08-23 RX ORDER — DULOXETINE HYDROCHLORIDE 20 MG/1
20 CAPSULE, DELAYED RELEASE PELLETS ORAL
Qty: 30 | Refills: 0 | Status: ACTIVE | COMMUNITY
Start: 2024-08-23 | End: 1900-01-01

## 2024-08-24 PROBLEM — M25.512 LEFT SHOULDER PAIN: Status: ACTIVE | Noted: 2024-08-23

## 2024-08-24 NOTE — PHYSICAL EXAM
[Alert] : alert [No Acute Distress] : in no acute distress [Normal Outer Ear/Nose] : the ears and nose were normal in appearance [Normal Appearance] : the appearance of the neck was normal [Supple] : the neck was supple [No Respiratory Distress] : no respiratory distress [No Acc Muscle Use] : no accessory muscle use [Respiration, Rhythm And Depth] : normal respiratory rhythm and effort [Auscultation Breath Sounds / Voice Sounds] : lungs were clear to auscultation bilaterally [Heart Rate And Rhythm] : heart rate was normal and rhythm regular [Bowel Sounds] : normal bowel sounds [Abdomen Tenderness] : non-tender [Abdomen Soft] : soft [No Spinal Tenderness] : no spinal tenderness [Normal Gait] : normal gait [Normal Color / Pigmentation] : normal skin color and pigmentation [Normal Turgor] : normal skin turgor [No Focal Deficits] : no focal deficits [Normal Affect] : the affect was normal [Normal Mood] : the mood was normal [de-identified] : Several trigger points appreciated on the L superior border of the trapezius and L side neck, no issues with rotator cuff, able to abduct arms greater than 90 degrees w/o issues

## 2024-08-24 NOTE — HISTORY OF PRESENT ILLNESS
-- ALLY CINTRON (Emergency Contact) [phone] (M)          Alternative phone number: N/A     Turnaround time given to caller:   \"This message will be sent to [state Provider's name]. The clinical team will fulfill your request as soon as they review your message.\"     [One fall no injury in past year] : Patient reported one fall in the past year without injury [Patient is independent with] : bathing [Completely Independent] : Completely independent. [] : managing medications [Independent] : managing finances [Smoke Detector] : smoke detector [Carbon Monoxide Detector] : carbon monoxide detector [Wears Seat Belt] : wears seat belt [Wears Sunscreen] : wears sunscreen [NO] : No [Little interest or pleasure doing things] : 1) Little interest or pleasure doing things [Feeling down, depressed, or hopeless] : 2) Feeling down, depressed, or hopeless [0] : 2) Feeling down, depressed, or hopeless: Not at all (0) [PHQ-2 Negative - No further assessment needed] : PHQ-2 Negative - No further assessment needed [FreeTextEntry1] : 67 y/o F with PMHx of R knee OA, R hip OA, HTN, b/l renal cysts, DM, stress incontinence, uterine fibroids, glaucoma, HLD, low vitamin D, thickened endometrium with post-menopausal bleeding presenting to the practice for an acute visit.  L shoulder pain: Onset since 1 week, affecting her sleep, has some numbness and tingling, took meloxicam last night with relief but it is coming back when pain medication wears off.  Perfuse sweating: Night sweats. No cancer history. Labs look good. Has been on trulicity regularly now. Does not check blood sugars. Possible hypoglycemia.  DM: A1c 6.9% with me at last visit. On trulicity now.   HTN: EKG from 2021 is normal. Had holter done in 2020 which was fine. On Amlodipine-Benazapril. BP not checked at home. Takes meds in the evening only. BP ranging in the 140's systolic with me.  HLD: On crestor 20 mg. Lipids okay.  GERD: S/p EGD in 2023, found Salcido's, has a hiatal hernia and non-specific gastritis. Stable. [Stair Lift] : no stair lift used in home [Grab Bars] : no grab bars [Shower Chair] : no shower chair [Night Light] : no night light [Anti-Slip Measures] : no anti-slip measures [Driving Concerns] : not driving or driving without noted concerns [[id number]] : 0

## 2024-08-24 NOTE — PHYSICAL EXAM
[Alert] : alert [No Acute Distress] : in no acute distress [Normal Outer Ear/Nose] : the ears and nose were normal in appearance [Normal Appearance] : the appearance of the neck was normal [Supple] : the neck was supple [No Respiratory Distress] : no respiratory distress [No Acc Muscle Use] : no accessory muscle use [Respiration, Rhythm And Depth] : normal respiratory rhythm and effort [Auscultation Breath Sounds / Voice Sounds] : lungs were clear to auscultation bilaterally [Bowel Sounds] : normal bowel sounds [Heart Rate And Rhythm] : heart rate was normal and rhythm regular [Abdomen Tenderness] : non-tender [Abdomen Soft] : soft [No Spinal Tenderness] : no spinal tenderness [Normal Gait] : normal gait [Normal Color / Pigmentation] : normal skin color and pigmentation [Normal Turgor] : normal skin turgor [No Focal Deficits] : no focal deficits [Normal Affect] : the affect was normal [Normal Mood] : the mood was normal [de-identified] : Several trigger points appreciated on the L superior border of the trapezius and L side neck, no issues with rotator cuff, able to abduct arms greater than 90 degrees w/o issues

## 2024-08-24 NOTE — HISTORY OF PRESENT ILLNESS
[One fall no injury in past year] : Patient reported one fall in the past year without injury [Patient is independent with] : bathing [Completely Independent] : Completely independent. [] : managing medications [Independent] : managing finances [Smoke Detector] : smoke detector [Carbon Monoxide Detector] : carbon monoxide detector [Wears Seat Belt] : wears seat belt [Wears Sunscreen] : wears sunscreen [NO] : No [Little interest or pleasure doing things] : 1) Little interest or pleasure doing things [Feeling down, depressed, or hopeless] : 2) Feeling down, depressed, or hopeless [0] : 2) Feeling down, depressed, or hopeless: Not at all (0) [PHQ-2 Negative - No further assessment needed] : PHQ-2 Negative - No further assessment needed [FreeTextEntry1] : 69 y/o F with PMHx of R knee OA, R hip OA, HTN, b/l renal cysts, DM, stress incontinence, uterine fibroids, glaucoma, HLD, low vitamin D, thickened endometrium with post-menopausal bleeding presenting to the practice for an acute visit.  L shoulder pain: Onset since 1 week, affecting her sleep, has some numbness and tingling, took meloxicam last night with relief but it is coming back when pain medication wears off.  Perfuse sweating: Night sweats. No cancer history. Labs look good. Has been on trulicity regularly now. Does not check blood sugars. Possible hypoglycemia.  DM: A1c 6.9% with me at last visit. On trulicity now.   HTN: EKG from 2021 is normal. Had holter done in 2020 which was fine. On Amlodipine-Benazapril. BP not checked at home. Takes meds in the evening only. BP ranging in the 140's systolic with me.  HLD: On crestor 20 mg. Lipids okay.  GERD: S/p EGD in 2023, found Salcido's, has a hiatal hernia and non-specific gastritis. Stable. [Stair Lift] : no stair lift used in home [Grab Bars] : no grab bars [Shower Chair] : no shower chair [Night Light] : no night light [Anti-Slip Measures] : no anti-slip measures [Driving Concerns] : not driving or driving without noted concerns [[id number]] : 0

## 2024-09-19 ENCOUNTER — TRANSCRIPTION ENCOUNTER (OUTPATIENT)
Age: 69
End: 2024-09-19

## 2024-09-19 ENCOUNTER — RX RENEWAL (OUTPATIENT)
Age: 69
End: 2024-09-19

## 2024-09-27 ENCOUNTER — APPOINTMENT (OUTPATIENT)
Dept: GERIATRICS | Facility: CLINIC | Age: 69
End: 2024-09-27
Payer: MEDICARE

## 2024-09-27 DIAGNOSIS — M54.50 LOW BACK PAIN, UNSPECIFIED: ICD-10-CM

## 2024-09-27 DIAGNOSIS — Z79.4 TYPE 2 DIABETES MELLITUS W/OUT COMPLICATIONS: ICD-10-CM

## 2024-09-27 DIAGNOSIS — M25.512 PAIN IN LEFT SHOULDER: ICD-10-CM

## 2024-09-27 DIAGNOSIS — E11.9 TYPE 2 DIABETES MELLITUS W/OUT COMPLICATIONS: ICD-10-CM

## 2024-09-27 PROCEDURE — 99443: CPT

## 2024-10-03 NOTE — ED ADULT TRIAGE NOTE - SPO2 (%)
Spoke with Dr. Mcmahon on the phone regarding results of the lab work done today.  Creatinine 2.1 with a potassium of 2.8  CBC showing an elevated WBC of 21.7 predominantly neutrophils, hemoglobin 7.1 and a platelet count of 548,000    The images described on the CT scan   95

## 2024-11-01 ENCOUNTER — TRANSCRIPTION ENCOUNTER (OUTPATIENT)
Age: 69
End: 2024-11-01

## 2024-11-01 RX ORDER — DULAGLUTIDE 3 MG/.5ML
3 INJECTION, SOLUTION SUBCUTANEOUS
Qty: 3 | Refills: 0 | Status: ACTIVE | COMMUNITY
Start: 2024-11-01 | End: 1900-01-01

## 2024-11-26 ENCOUNTER — RESULT REVIEW (OUTPATIENT)
Age: 69
End: 2024-11-26

## 2024-11-26 ENCOUNTER — APPOINTMENT (OUTPATIENT)
Dept: ULTRASOUND IMAGING | Facility: IMAGING CENTER | Age: 69
End: 2024-11-26
Payer: MEDICARE

## 2024-11-26 ENCOUNTER — APPOINTMENT (OUTPATIENT)
Dept: MAMMOGRAPHY | Facility: IMAGING CENTER | Age: 69
End: 2024-11-26
Payer: MEDICARE

## 2024-11-26 ENCOUNTER — OUTPATIENT (OUTPATIENT)
Dept: OUTPATIENT SERVICES | Facility: HOSPITAL | Age: 69
LOS: 1 days | End: 2024-11-26
Payer: COMMERCIAL

## 2024-11-26 DIAGNOSIS — Z12.39 ENCOUNTER FOR OTHER SCREENING FOR MALIGNANT NEOPLASM OF BREAST: ICD-10-CM

## 2024-11-26 DIAGNOSIS — Z98.49 CATARACT EXTRACTION STATUS, UNSPECIFIED EYE: Chronic | ICD-10-CM

## 2024-11-26 DIAGNOSIS — Z00.8 ENCOUNTER FOR OTHER GENERAL EXAMINATION: ICD-10-CM

## 2024-11-26 PROCEDURE — 76641 ULTRASOUND BREAST COMPLETE: CPT | Mod: 26,50

## 2024-11-26 PROCEDURE — 77063 BREAST TOMOSYNTHESIS BI: CPT | Mod: 26

## 2024-11-26 PROCEDURE — 76641 ULTRASOUND BREAST COMPLETE: CPT

## 2024-11-26 PROCEDURE — 77063 BREAST TOMOSYNTHESIS BI: CPT

## 2024-11-26 PROCEDURE — 77067 SCR MAMMO BI INCL CAD: CPT

## 2024-11-26 PROCEDURE — 77067 SCR MAMMO BI INCL CAD: CPT | Mod: 26

## 2024-12-10 ENCOUNTER — NON-APPOINTMENT (OUTPATIENT)
Age: 69
End: 2024-12-10

## 2024-12-12 ENCOUNTER — OUTPATIENT (OUTPATIENT)
Dept: OUTPATIENT SERVICES | Facility: HOSPITAL | Age: 69
LOS: 1 days | End: 2024-12-12
Payer: COMMERCIAL

## 2024-12-12 ENCOUNTER — APPOINTMENT (OUTPATIENT)
Dept: MAMMOGRAPHY | Facility: IMAGING CENTER | Age: 69
End: 2024-12-12
Payer: MEDICARE

## 2024-12-12 ENCOUNTER — APPOINTMENT (OUTPATIENT)
Dept: ULTRASOUND IMAGING | Facility: IMAGING CENTER | Age: 69
End: 2024-12-12
Payer: MEDICARE

## 2024-12-12 DIAGNOSIS — Z00.8 ENCOUNTER FOR OTHER GENERAL EXAMINATION: ICD-10-CM

## 2024-12-12 DIAGNOSIS — Z98.890 OTHER SPECIFIED POSTPROCEDURAL STATES: Chronic | ICD-10-CM

## 2024-12-12 DIAGNOSIS — Z98.49 CATARACT EXTRACTION STATUS, UNSPECIFIED EYE: Chronic | ICD-10-CM

## 2024-12-12 PROCEDURE — 77065 DX MAMMO INCL CAD UNI: CPT

## 2024-12-12 PROCEDURE — 76642 ULTRASOUND BREAST LIMITED: CPT | Mod: 26,50

## 2024-12-12 PROCEDURE — 77065 DX MAMMO INCL CAD UNI: CPT | Mod: 26,LT

## 2024-12-12 PROCEDURE — 76642 ULTRASOUND BREAST LIMITED: CPT

## 2024-12-29 ENCOUNTER — EMERGENCY (EMERGENCY)
Facility: HOSPITAL | Age: 69
LOS: 1 days | Discharge: HOME | End: 2024-12-29
Admitting: EMERGENCY MEDICINE
Payer: MEDICARE

## 2024-12-29 VITALS
SYSTOLIC BLOOD PRESSURE: 145 MMHG | HEART RATE: 109 BPM | WEIGHT: 169.98 LBS | TEMPERATURE: 98 F | DIASTOLIC BLOOD PRESSURE: 89 MMHG | OXYGEN SATURATION: 100 % | RESPIRATION RATE: 20 BRPM

## 2024-12-29 VITALS
TEMPERATURE: 99 F | RESPIRATION RATE: 19 BRPM | SYSTOLIC BLOOD PRESSURE: 148 MMHG | HEART RATE: 90 BPM | OXYGEN SATURATION: 100 % | DIASTOLIC BLOOD PRESSURE: 81 MMHG

## 2024-12-29 DIAGNOSIS — Z98.890 OTHER SPECIFIED POSTPROCEDURAL STATES: Chronic | ICD-10-CM

## 2024-12-29 DIAGNOSIS — Z98.49 CATARACT EXTRACTION STATUS, UNSPECIFIED EYE: Chronic | ICD-10-CM

## 2024-12-29 LAB
ALBUMIN SERPL ELPH-MCNC: 5.1 G/DL — HIGH (ref 3.3–5)
ALP SERPL-CCNC: 87 U/L — SIGNIFICANT CHANGE UP (ref 40–120)
ALT FLD-CCNC: 37 U/L — HIGH (ref 4–33)
ANION GAP SERPL CALC-SCNC: 18 MMOL/L — HIGH (ref 7–14)
AST SERPL-CCNC: 27 U/L — SIGNIFICANT CHANGE UP (ref 4–32)
BASOPHILS # BLD AUTO: 0.02 K/UL — SIGNIFICANT CHANGE UP (ref 0–0.2)
BASOPHILS NFR BLD AUTO: 0.3 % — SIGNIFICANT CHANGE UP (ref 0–2)
BILIRUB SERPL-MCNC: 0.4 MG/DL — SIGNIFICANT CHANGE UP (ref 0.2–1.2)
BLOOD GAS VENOUS COMPREHENSIVE RESULT: SIGNIFICANT CHANGE UP
BUN SERPL-MCNC: 13 MG/DL — SIGNIFICANT CHANGE UP (ref 7–23)
CALCIUM SERPL-MCNC: 10.7 MG/DL — HIGH (ref 8.4–10.5)
CHLORIDE SERPL-SCNC: 103 MMOL/L — SIGNIFICANT CHANGE UP (ref 98–107)
CO2 SERPL-SCNC: 24 MMOL/L — SIGNIFICANT CHANGE UP (ref 22–31)
CREAT SERPL-MCNC: 1.05 MG/DL — SIGNIFICANT CHANGE UP (ref 0.5–1.3)
EGFR: 58 ML/MIN/1.73M2 — LOW
EOSINOPHIL # BLD AUTO: 0.16 K/UL — SIGNIFICANT CHANGE UP (ref 0–0.5)
EOSINOPHIL NFR BLD AUTO: 2.3 % — SIGNIFICANT CHANGE UP (ref 0–6)
GLUCOSE SERPL-MCNC: 144 MG/DL — HIGH (ref 70–99)
HCT VFR BLD CALC: 45.3 % — HIGH (ref 34.5–45)
HGB BLD-MCNC: 15.2 G/DL — SIGNIFICANT CHANGE UP (ref 11.5–15.5)
IANC: 4.46 K/UL — SIGNIFICANT CHANGE UP (ref 1.8–7.4)
IMM GRANULOCYTES NFR BLD AUTO: 0.4 % — SIGNIFICANT CHANGE UP (ref 0–0.9)
LIDOCAIN IGE QN: 32 U/L — SIGNIFICANT CHANGE UP (ref 7–60)
LYMPHOCYTES # BLD AUTO: 2.05 K/UL — SIGNIFICANT CHANGE UP (ref 1–3.3)
LYMPHOCYTES # BLD AUTO: 29.2 % — SIGNIFICANT CHANGE UP (ref 13–44)
MCHC RBC-ENTMCNC: 28.6 PG — SIGNIFICANT CHANGE UP (ref 27–34)
MCHC RBC-ENTMCNC: 33.6 G/DL — SIGNIFICANT CHANGE UP (ref 32–36)
MCV RBC AUTO: 85.3 FL — SIGNIFICANT CHANGE UP (ref 80–100)
MONOCYTES # BLD AUTO: 0.31 K/UL — SIGNIFICANT CHANGE UP (ref 0–0.9)
MONOCYTES NFR BLD AUTO: 4.4 % — SIGNIFICANT CHANGE UP (ref 2–14)
NEUTROPHILS # BLD AUTO: 4.46 K/UL — SIGNIFICANT CHANGE UP (ref 1.8–7.4)
NEUTROPHILS NFR BLD AUTO: 63.4 % — SIGNIFICANT CHANGE UP (ref 43–77)
NRBC # BLD: 0 /100 WBCS — SIGNIFICANT CHANGE UP (ref 0–0)
NRBC # FLD: 0 K/UL — SIGNIFICANT CHANGE UP (ref 0–0)
PLATELET # BLD AUTO: 345 K/UL — SIGNIFICANT CHANGE UP (ref 150–400)
POTASSIUM SERPL-MCNC: 3.8 MMOL/L — SIGNIFICANT CHANGE UP (ref 3.5–5.3)
POTASSIUM SERPL-SCNC: 3.8 MMOL/L — SIGNIFICANT CHANGE UP (ref 3.5–5.3)
PROT SERPL-MCNC: 9.4 G/DL — HIGH (ref 6–8.3)
RBC # BLD: 5.31 M/UL — HIGH (ref 3.8–5.2)
RBC # FLD: 13.1 % — SIGNIFICANT CHANGE UP (ref 10.3–14.5)
SODIUM SERPL-SCNC: 145 MMOL/L — SIGNIFICANT CHANGE UP (ref 135–145)
WBC # BLD: 7.03 K/UL — SIGNIFICANT CHANGE UP (ref 3.8–10.5)
WBC # FLD AUTO: 7.03 K/UL — SIGNIFICANT CHANGE UP (ref 3.8–10.5)

## 2024-12-29 PROCEDURE — 99284 EMERGENCY DEPT VISIT MOD MDM: CPT

## 2024-12-29 PROCEDURE — 93010 ELECTROCARDIOGRAM REPORT: CPT

## 2024-12-29 RX ORDER — MAGNESIUM, ALUMINUM HYDROXIDE 200-225/5
30 SUSPENSION, ORAL (FINAL DOSE FORM) ORAL ONCE
Refills: 0 | Status: COMPLETED | OUTPATIENT
Start: 2024-12-29 | End: 2024-12-29

## 2024-12-29 RX ORDER — ONDANSETRON HYDROCHLORIDE 4 MG/1
4 TABLET, FILM COATED ORAL ONCE
Refills: 0 | Status: COMPLETED | OUTPATIENT
Start: 2024-12-29 | End: 2024-12-29

## 2024-12-29 RX ORDER — SODIUM CHLORIDE 9 MG/ML
2000 INJECTION, SOLUTION INTRAMUSCULAR; INTRAVENOUS; SUBCUTANEOUS ONCE
Refills: 0 | Status: COMPLETED | OUTPATIENT
Start: 2024-12-29 | End: 2024-12-29

## 2024-12-29 RX ORDER — METOCLOPRAMIDE HYDROCHLORIDE 10 MG/1
10 TABLET ORAL ONCE
Refills: 0 | Status: COMPLETED | OUTPATIENT
Start: 2024-12-29 | End: 2024-12-29

## 2024-12-29 RX ORDER — SODIUM CHLORIDE 9 MG/ML
200 INJECTION, SOLUTION INTRAMUSCULAR; INTRAVENOUS; SUBCUTANEOUS ONCE
Refills: 0 | Status: COMPLETED | OUTPATIENT
Start: 2024-12-29 | End: 2024-12-29

## 2024-12-29 RX ORDER — FAMOTIDINE 20 MG/1
20 TABLET, FILM COATED ORAL ONCE
Refills: 0 | Status: COMPLETED | OUTPATIENT
Start: 2024-12-29 | End: 2024-12-29

## 2024-12-29 RX ADMIN — Medication 30 MILLILITER(S): at 20:55

## 2024-12-29 RX ADMIN — METOCLOPRAMIDE HYDROCHLORIDE 10 MILLIGRAM(S): 10 TABLET ORAL at 20:55

## 2024-12-29 RX ADMIN — ONDANSETRON HYDROCHLORIDE 4 MILLIGRAM(S): 4 TABLET, FILM COATED ORAL at 23:25

## 2024-12-29 RX ADMIN — FAMOTIDINE 20 MILLIGRAM(S): 20 TABLET, FILM COATED ORAL at 20:55

## 2024-12-29 RX ADMIN — SODIUM CHLORIDE 2000 MILLILITER(S): 9 INJECTION, SOLUTION INTRAMUSCULAR; INTRAVENOUS; SUBCUTANEOUS at 21:36

## 2024-12-29 NOTE — ED PROVIDER NOTE - NSFOLLOWUPINSTRUCTIONS_ED_ALL_ED_FT
Follow up with your PMD within 1-2 days or you can call our clinic at [phone] for an appointment  Take all of your other medications as previously prescribed.  Worsening, continued or ANY new concerning symptoms return to the Emergency Department.    Vomiting, Adult  Vomiting is when stomach contents forcefully come out of the mouth. Many people notice nausea before vomiting. Vomiting can make you feel weak and cause you to become dehydrated.    Dehydration can make you feel tired and thirsty, cause you to have a dry mouth, and decrease how often you urinate. Older adults and people who have other diseases or a weak body defense system (immune system) are at higher risk for dehydration. It is important to treat vomiting as told by your health care provider.    Follow these instructions at home:  Washing hands with soap and water.  Watch your symptoms for any changes. Tell your health care provider about them.    Eating and drinking    Bananas next to a bowl of applesauce.  A bottle of clear fruit juice and glass of water.  Follow these recommendations as told by your health care provider:  Take an oral rehydration solution (ORS). This is a drink that is sold at pharmacies and retail stores.  Eat bland, easy-to-digest foods in small amounts as you are able. These foods include bananas, applesauce, rice, lean meats, toast, and crackers.  Drink clear fluids slowly and in small amounts as you are able. Clear fluids include water, ice chips, low-calorie sports drinks, and fruit juice that has water added (diluted fruit juice).  Avoid drinking fluids that contain a lot of sugar or caffeine, such as energy drinks, sports drinks, and soda.  Avoid alcohol.  Avoid spicy or fatty foods.  General instructions    Wash your hands often using soap and water for at least 20 seconds. If soap and water are not available, use hand .  Make sure that everyone in your household washes their hands frequently.  Take over-the-counter and prescription medicines only as told by your health care provider.  Rest at home while you recover.  Watch your condition for any changes.  Keep all follow-up visits. This is important.  Contact a health care provider if:  Your vomiting gets worse.  You have new symptoms.  You have a fever.  You cannot drink fluids without vomiting.  You feel light-headed or dizzy.  You have a headache.  You have muscle cramps.  You have a rash.  You have pain while urinating.  Get help right away if:  You have pain in your chest, neck, arm, or jaw.  Your heart is beating very quickly.  You have trouble breathing or you are breathing very quickly.  You feel extremely weak or you faint.  Your skin feels cold and clammy.  You feel confused.  You have persistent vomiting.  You have vomit that is bright red or looks like black coffee grounds.  You have stools (feces) that are bloody or black, or stools that look like tar.  You have a severe headache, a stiff neck, or both.  You have severe pain, cramping, or bloating in your abdomen.  You have signs of dehydration, such as:  Dark urine, very little urine, or no urine.  Cracked lips.  Dry mouth.  Sunken eyes.  Sleepiness.  Weakness.  These symptoms may be an emergency. Get help right away. Call 911.  Do not wait to see if the symptoms will go away.  Do not drive yourself to the hospital.  Summary  Vomiting is when stomach contents forcefully come out of the mouth. Vomiting can cause you to become dehydrated.  It is important to treat vomiting as told by your health care provider. Follow your health care provider's instructions about eating and drinking.  Wash your hands often using soap and water for at least 20 seconds. If soap and water are not available, use hand .  Watch your condition for any changes and for signs of dehydration.  Keep all follow-up visits. This is important.

## 2024-12-29 NOTE — ED ADULT TRIAGE NOTE - PATIENT'S PREFERRED PRONOUN
Pt presented to the ED with a puncture wound. Clinical impression of
cellulitis of left leg. Pt was discharged with rx for cephalexin 500 mg PO TID
x10 days and Septra 400 mg PO BID x10 days. This case was discussed with Dereck FUENTES and it was determined that the pt should be receiving a higher
dosage of Septra per indication recommendations. A new prescription was called
into the pt's preferred [facility] pharmacy for Septra Ds 1 tab PO BID x10 days.
The pt's mother was called and verbalized understanding of the new
presription.
Her/She

## 2024-12-29 NOTE — ED PROVIDER NOTE - OBJECTIVE STATEMENT
69-year-old female with past medical history of diabetes, hypertension, hyperlipidemia presents to the ED complaining of vomiting since this morning.  Patient states she was on Trulicity for 3 years, and stopped 1 month ago due to an episode of hypoglycemia, she was restarted recently on Trulicity and took last night, woke up this morning with nausea and multiple episodes of nonbloody nonbilious vomiting.  She denies any abdominal pain, diarrhea, fevers, chills, headache, dizziness, palpitations, chest pain, shortness of breath, or any other associated symptoms.  She said she was previously on Trulicity the dose was increased gradually, however last night she was restarted on the high dose.  Denies any other complaints.

## 2024-12-29 NOTE — ED ADULT NURSE NOTE - OBJECTIVE STATEMENT
Pt received to intake 10C, A&Ox4 and ambulatory, c/o abdominal and pelvic cramping with nausea and vomiting x1 day. Pt started taking Trulicity yesterday after being off for 2 months. Hx DM, HLD, HTN, glaucoma. Reports waking up feeling sick so came to ED for eval. Denies chest pain, SOB, fevers, chills, dizziness, lightheadedness. Breathing even and unlabored on room air. NAD noted. 20G IV placed to left AC, labs drawn and sent. Medicated as ordered. Pending re-eval and dispo.

## 2024-12-29 NOTE — ED PROVIDER NOTE - CLINICAL SUMMARY MEDICAL DECISION MAKING FREE TEXT BOX
69-year-old female with past medical history of diabetes, hypertension, hyperlipidemia presents to the ED complaining of vomiting since this morning. Vital signs reviewed, mildly tachycardic with a heart rate of 109.  Abdomen completely soft and nontender, with no peritoneal signs.  Impression vomiting, could be likely secondary to side effect of Trulicity, tachydracia due to dehydration.  Plan for labs to rule out electrolyte imbalance or metabolic disturbance, antiemetic, IV hydration, reassess.

## 2024-12-29 NOTE — ED PROVIDER NOTE - PATIENT PORTAL LINK FT
You can access the FollowMyHealth Patient Portal offered by [facility] by registering at the following website: [email or web address]. By joining [employer]’s FollowMyHealth portal, you will also be able to view your health information using other applications (apps) compatible with our system.

## 2024-12-30 NOTE — ED POST DISCHARGE NOTE - REASON FOR FOLLOW-UP
Other Patient called Rx not at pharmacy. No RX listed in prescription writer or ED provider note or ED discharge summary. Teams Provider who saw patient awaiting answer.

## 2025-01-06 ENCOUNTER — APPOINTMENT (OUTPATIENT)
Dept: GERIATRICS | Facility: CLINIC | Age: 70
End: 2025-01-06
Payer: MEDICARE

## 2025-01-06 VITALS
SYSTOLIC BLOOD PRESSURE: 129 MMHG | TEMPERATURE: 98.6 F | OXYGEN SATURATION: 97 % | BODY MASS INDEX: 26.68 KG/M2 | HEIGHT: 67 IN | DIASTOLIC BLOOD PRESSURE: 83 MMHG | WEIGHT: 170 LBS | RESPIRATION RATE: 16 BRPM | HEART RATE: 80 BPM

## 2025-01-06 DIAGNOSIS — E11.69 TYPE 2 DIABETES MELLITUS WITH OTHER SPECIFIED COMPLICATION: ICD-10-CM

## 2025-01-06 DIAGNOSIS — E78.5 TYPE 2 DIABETES MELLITUS WITH OTHER SPECIFIED COMPLICATION: ICD-10-CM

## 2025-01-06 DIAGNOSIS — Z79.4 TYPE 2 DIABETES MELLITUS W/OUT COMPLICATIONS: ICD-10-CM

## 2025-01-06 DIAGNOSIS — E11.59 TYPE 2 DIABETES MELLITUS WITH OTHER CIRCULATORY COMPLICATIONS: ICD-10-CM

## 2025-01-06 DIAGNOSIS — E11.9 TYPE 2 DIABETES MELLITUS W/OUT COMPLICATIONS: ICD-10-CM

## 2025-01-06 DIAGNOSIS — E78.5 HYPERLIPIDEMIA, UNSPECIFIED: ICD-10-CM

## 2025-01-06 DIAGNOSIS — I10 ESSENTIAL (PRIMARY) HYPERTENSION: ICD-10-CM

## 2025-01-06 DIAGNOSIS — I15.2 TYPE 2 DIABETES MELLITUS WITH OTHER CIRCULATORY COMPLICATIONS: ICD-10-CM

## 2025-01-06 DIAGNOSIS — H40.9 UNSPECIFIED GLAUCOMA: ICD-10-CM

## 2025-01-06 DIAGNOSIS — R11.2 NAUSEA WITH VOMITING, UNSPECIFIED: ICD-10-CM

## 2025-01-06 PROCEDURE — 99214 OFFICE O/P EST MOD 30 MIN: CPT

## 2025-01-06 PROCEDURE — G2211 COMPLEX E/M VISIT ADD ON: CPT

## 2025-01-06 RX ORDER — DULAGLUTIDE 0.75 MG/.5ML
0.75 INJECTION, SOLUTION SUBCUTANEOUS
Qty: 1 | Refills: 2 | Status: ACTIVE | COMMUNITY
Start: 2025-01-06 | End: 1900-01-01

## 2025-01-06 RX ORDER — ONDANSETRON 4 MG/1
4 TABLET, ORALLY DISINTEGRATING ORAL
Qty: 42 | Refills: 0 | Status: ACTIVE | COMMUNITY
Start: 2025-01-06 | End: 1900-01-01

## 2025-01-06 RX ORDER — OMEPRAZOLE 20 MG/1
20 CAPSULE, DELAYED RELEASE ORAL
Qty: 90 | Refills: 0 | Status: ACTIVE | COMMUNITY
Start: 2025-01-06 | End: 1900-01-01

## 2025-01-14 ENCOUNTER — NON-APPOINTMENT (OUTPATIENT)
Age: 70
End: 2025-01-14

## 2025-01-14 ENCOUNTER — TRANSCRIPTION ENCOUNTER (OUTPATIENT)
Age: 70
End: 2025-01-14

## 2025-01-14 LAB
ALBUMIN SERPL ELPH-MCNC: 4.6 G/DL
ALP BLD-CCNC: 93 U/L
ALT SERPL-CCNC: 23 U/L
ANION GAP SERPL CALC-SCNC: 12 MMOL/L
AST SERPL-CCNC: 15 U/L
BASOPHILS # BLD AUTO: 0.07 K/UL
BASOPHILS NFR BLD AUTO: 1.3 %
BILIRUB SERPL-MCNC: 0.2 MG/DL
BUN SERPL-MCNC: 8 MG/DL
CALCIUM SERPL-MCNC: 10.5 MG/DL
CHLORIDE SERPL-SCNC: 102 MMOL/L
CHOLEST SERPL-MCNC: 192 MG/DL
CO2 SERPL-SCNC: 27 MMOL/L
CREAT SERPL-MCNC: 0.88 MG/DL
EGFR: 72 ML/MIN/1.73M2
EOSINOPHIL # BLD AUTO: 0.47 K/UL
EOSINOPHIL NFR BLD AUTO: 8.5 %
ESTIMATED AVERAGE GLUCOSE: 140 MG/DL
GLUCOSE SERPL-MCNC: 93 MG/DL
HBA1C MFR BLD HPLC: 6.5 %
HCT VFR BLD CALC: 42.7 %
HDLC SERPL-MCNC: 70 MG/DL
HGB BLD-MCNC: 13.6 G/DL
IMM GRANULOCYTES NFR BLD AUTO: 0.4 %
LDLC SERPL CALC-MCNC: 103 MG/DL
LYMPHOCYTES # BLD AUTO: 2.51 K/UL
LYMPHOCYTES NFR BLD AUTO: 45.6 %
MAN DIFF?: NORMAL
MCHC RBC-ENTMCNC: 28.3 PG
MCHC RBC-ENTMCNC: 31.9 G/DL
MCV RBC AUTO: 89 FL
MONOCYTES # BLD AUTO: 0.36 K/UL
MONOCYTES NFR BLD AUTO: 6.5 %
NEUTROPHILS # BLD AUTO: 2.08 K/UL
NEUTROPHILS NFR BLD AUTO: 37.7 %
NONHDLC SERPL-MCNC: 122 MG/DL
PLATELET # BLD AUTO: 338 K/UL
POTASSIUM SERPL-SCNC: 4.3 MMOL/L
PROT SERPL-MCNC: 8.1 G/DL
RBC # BLD: 4.8 M/UL
RBC # FLD: 13.1 %
SODIUM SERPL-SCNC: 141 MMOL/L
TRIGL SERPL-MCNC: 105 MG/DL
WBC # FLD AUTO: 5.51 K/UL

## 2025-01-14 RX ORDER — BLOOD-GLUCOSE,RECEIVER,CONT
EACH MISCELLANEOUS
Qty: 1 | Refills: 0 | Status: ACTIVE | COMMUNITY
Start: 2025-01-14 | End: 1900-01-01

## 2025-01-14 RX ORDER — BLOOD-GLUCOSE SENSOR
EACH MISCELLANEOUS
Qty: 1 | Refills: 0 | Status: ACTIVE | COMMUNITY
Start: 2025-01-14 | End: 1900-01-01

## 2025-02-04 ENCOUNTER — TRANSCRIPTION ENCOUNTER (OUTPATIENT)
Age: 70
End: 2025-02-04

## 2025-02-04 RX ORDER — BLOOD-GLUCOSE,RECEIVER,CONT
EACH MISCELLANEOUS
Qty: 1 | Refills: 0 | Status: ACTIVE | COMMUNITY
Start: 2025-02-04 | End: 1900-01-01

## 2025-02-04 RX ORDER — BLOOD-GLUCOSE SENSOR
EACH MISCELLANEOUS
Qty: 1 | Refills: 0 | Status: ACTIVE | COMMUNITY
Start: 2025-02-04 | End: 1900-01-01

## 2025-03-06 RX ORDER — BLOOD SUGAR DIAGNOSTIC
STRIP MISCELLANEOUS 3 TIMES DAILY
Qty: 3 | Refills: 1 | Status: ACTIVE | COMMUNITY
Start: 2025-03-06 | End: 1900-01-01

## 2025-03-06 RX ORDER — BLOOD-GLUCOSE METER
W/DEVICE EACH MISCELLANEOUS
Qty: 1 | Refills: 0 | Status: ACTIVE | COMMUNITY
Start: 2025-03-06 | End: 1900-01-01

## 2025-03-06 RX ORDER — BLOOD-GLUCOSE METER
EACH MISCELLANEOUS
Qty: 1 | Refills: 0 | Status: ACTIVE | COMMUNITY
Start: 2025-03-06 | End: 1900-01-01

## 2025-03-06 RX ORDER — LANCETS 33 GAUGE
EACH MISCELLANEOUS
Qty: 100 | Refills: 11 | Status: ACTIVE | COMMUNITY
Start: 2025-03-06 | End: 1900-01-01

## 2025-03-24 ENCOUNTER — RX RENEWAL (OUTPATIENT)
Age: 70
End: 2025-03-24

## 2025-03-31 ENCOUNTER — RX RENEWAL (OUTPATIENT)
Age: 70
End: 2025-03-31

## 2025-04-03 ENCOUNTER — RX RENEWAL (OUTPATIENT)
Age: 70
End: 2025-04-03

## 2025-04-07 ENCOUNTER — NON-APPOINTMENT (OUTPATIENT)
Age: 70
End: 2025-04-07

## 2025-04-07 ENCOUNTER — APPOINTMENT (OUTPATIENT)
Dept: GERIATRICS | Facility: CLINIC | Age: 70
End: 2025-04-07
Payer: MEDICARE

## 2025-04-07 VITALS
OXYGEN SATURATION: 99 % | WEIGHT: 169 LBS | DIASTOLIC BLOOD PRESSURE: 85 MMHG | BODY MASS INDEX: 26.47 KG/M2 | TEMPERATURE: 97.8 F | SYSTOLIC BLOOD PRESSURE: 155 MMHG | HEART RATE: 76 BPM | RESPIRATION RATE: 14 BRPM

## 2025-04-07 DIAGNOSIS — I15.2 TYPE 2 DIABETES MELLITUS WITH OTHER CIRCULATORY COMPLICATIONS: ICD-10-CM

## 2025-04-07 DIAGNOSIS — E11.59 TYPE 2 DIABETES MELLITUS WITH OTHER CIRCULATORY COMPLICATIONS: ICD-10-CM

## 2025-04-07 DIAGNOSIS — H40.9 UNSPECIFIED GLAUCOMA: ICD-10-CM

## 2025-04-07 DIAGNOSIS — E11.9 TYPE 2 DIABETES MELLITUS W/OUT COMPLICATIONS: ICD-10-CM

## 2025-04-07 DIAGNOSIS — E11.69 TYPE 2 DIABETES MELLITUS WITH OTHER SPECIFIED COMPLICATION: ICD-10-CM

## 2025-04-07 DIAGNOSIS — R92.30 DENSE BREASTS, UNSPECIFIED: ICD-10-CM

## 2025-04-07 DIAGNOSIS — Z79.4 TYPE 2 DIABETES MELLITUS W/OUT COMPLICATIONS: ICD-10-CM

## 2025-04-07 DIAGNOSIS — E78.5 TYPE 2 DIABETES MELLITUS WITH OTHER SPECIFIED COMPLICATION: ICD-10-CM

## 2025-04-07 PROCEDURE — 99215 OFFICE O/P EST HI 40 MIN: CPT

## 2025-06-05 ENCOUNTER — OUTPATIENT (OUTPATIENT)
Dept: OUTPATIENT SERVICES | Facility: HOSPITAL | Age: 70
LOS: 1 days | End: 2025-06-05
Payer: COMMERCIAL

## 2025-06-05 ENCOUNTER — APPOINTMENT (OUTPATIENT)
Dept: MAMMOGRAPHY | Facility: IMAGING CENTER | Age: 70
End: 2025-06-05
Payer: MEDICARE

## 2025-06-05 ENCOUNTER — RESULT REVIEW (OUTPATIENT)
Age: 70
End: 2025-06-05

## 2025-06-05 DIAGNOSIS — Z98.49 CATARACT EXTRACTION STATUS, UNSPECIFIED EYE: Chronic | ICD-10-CM

## 2025-06-05 DIAGNOSIS — R92.30 DENSE BREASTS, UNSPECIFIED: ICD-10-CM

## 2025-06-05 DIAGNOSIS — Z98.890 OTHER SPECIFIED POSTPROCEDURAL STATES: Chronic | ICD-10-CM

## 2025-06-05 DIAGNOSIS — Z00.8 ENCOUNTER FOR OTHER GENERAL EXAMINATION: ICD-10-CM

## 2025-06-05 PROCEDURE — G0279: CPT | Mod: 26

## 2025-06-05 PROCEDURE — 77065 DX MAMMO INCL CAD UNI: CPT

## 2025-06-05 PROCEDURE — G0279: CPT

## 2025-06-05 PROCEDURE — 77065 DX MAMMO INCL CAD UNI: CPT | Mod: 26,LT

## 2025-06-27 ENCOUNTER — TRANSCRIPTION ENCOUNTER (OUTPATIENT)
Age: 70
End: 2025-06-27

## 2025-06-27 PROBLEM — J06.9 ACUTE URI: Status: ACTIVE | Noted: 2025-06-27 | Resolved: 2025-07-27

## 2025-06-27 RX ORDER — PREDNISONE 20 MG/1
20 TABLET ORAL
Qty: 5 | Refills: 0 | Status: ACTIVE | COMMUNITY
Start: 2025-06-27 | End: 1900-01-01

## 2025-06-27 RX ORDER — FLUTICASONE PROPIONATE 50 UG/1
50 SPRAY NASAL
Qty: 16 | Refills: 0 | Status: ACTIVE | COMMUNITY
Start: 2025-06-27 | End: 1900-01-01

## 2025-06-27 RX ORDER — BENZONATATE 200 MG/1
200 CAPSULE ORAL
Qty: 60 | Refills: 0 | Status: ACTIVE | COMMUNITY
Start: 2025-06-27 | End: 1900-01-01

## 2025-07-03 ENCOUNTER — RX RENEWAL (OUTPATIENT)
Age: 70
End: 2025-07-03

## 2025-07-29 ENCOUNTER — APPOINTMENT (OUTPATIENT)
Dept: GERIATRICS | Facility: CLINIC | Age: 70
End: 2025-07-29
Payer: MEDICARE

## 2025-07-29 VITALS
OXYGEN SATURATION: 97 % | SYSTOLIC BLOOD PRESSURE: 125 MMHG | HEART RATE: 84 BPM | RESPIRATION RATE: 14 BRPM | BODY MASS INDEX: 27 KG/M2 | WEIGHT: 172 LBS | HEIGHT: 67 IN | TEMPERATURE: 98.7 F | DIASTOLIC BLOOD PRESSURE: 79 MMHG

## 2025-07-29 DIAGNOSIS — R11.2 NAUSEA WITH VOMITING, UNSPECIFIED: ICD-10-CM

## 2025-07-29 DIAGNOSIS — Z79.4 TYPE 2 DIABETES MELLITUS W/OUT COMPLICATIONS: ICD-10-CM

## 2025-07-29 DIAGNOSIS — E11.59 TYPE 2 DIABETES MELLITUS WITH OTHER CIRCULATORY COMPLICATIONS: ICD-10-CM

## 2025-07-29 DIAGNOSIS — R92.8 OTHER ABNORMAL AND INCONCLUSIVE FINDINGS ON DIAGNOSTIC IMAGING OF BREAST: ICD-10-CM

## 2025-07-29 DIAGNOSIS — E11.69 TYPE 2 DIABETES MELLITUS WITH OTHER SPECIFIED COMPLICATION: ICD-10-CM

## 2025-07-29 DIAGNOSIS — I15.2 TYPE 2 DIABETES MELLITUS WITH OTHER CIRCULATORY COMPLICATIONS: ICD-10-CM

## 2025-07-29 DIAGNOSIS — K21.9 GASTRO-ESOPHAGEAL REFLUX DISEASE W/OUT ESOPHAGITIS: ICD-10-CM

## 2025-07-29 DIAGNOSIS — H40.1120 PRIMARY OPEN-ANGLE GLAUCOMA, LEFT EYE, STAGE UNSPECIFIED: ICD-10-CM

## 2025-07-29 DIAGNOSIS — E11.9 TYPE 2 DIABETES MELLITUS W/OUT COMPLICATIONS: ICD-10-CM

## 2025-07-29 DIAGNOSIS — L72.9 FOLLICULAR CYST OF THE SKIN AND SUBCUTANEOUS TISSUE, UNSPECIFIED: ICD-10-CM

## 2025-07-29 DIAGNOSIS — E78.5 TYPE 2 DIABETES MELLITUS WITH OTHER SPECIFIED COMPLICATION: ICD-10-CM

## 2025-07-29 DIAGNOSIS — D36.9 BENIGN NEOPLASM, UNSPECIFIED SITE: ICD-10-CM

## 2025-07-29 DIAGNOSIS — I10 ESSENTIAL (PRIMARY) HYPERTENSION: ICD-10-CM

## 2025-07-29 PROCEDURE — 99214 OFFICE O/P EST MOD 30 MIN: CPT

## 2025-07-29 PROCEDURE — G2211 COMPLEX E/M VISIT ADD ON: CPT

## 2025-07-29 RX ORDER — ONDANSETRON 4 MG/1
4 TABLET, ORALLY DISINTEGRATING ORAL
Qty: 30 | Refills: 0 | Status: ACTIVE | COMMUNITY
Start: 2025-07-29 | End: 1900-01-01

## 2025-08-05 ENCOUNTER — APPOINTMENT (OUTPATIENT)
Facility: CLINIC | Age: 70
End: 2025-08-05
Payer: MEDICARE

## 2025-08-05 VITALS
BODY MASS INDEX: 27.97 KG/M2 | SYSTOLIC BLOOD PRESSURE: 150 MMHG | DIASTOLIC BLOOD PRESSURE: 82 MMHG | HEIGHT: 66 IN | OXYGEN SATURATION: 98 % | HEART RATE: 78 BPM | WEIGHT: 174 LBS

## 2025-08-05 PROCEDURE — 99204 OFFICE O/P NEW MOD 45 MIN: CPT

## 2025-08-05 PROCEDURE — G2211 COMPLEX E/M VISIT ADD ON: CPT

## (undated) DEVICE — FOLEY TRAY 16FR 5CC LF UMETER CLOSED

## (undated) DEVICE — Device

## (undated) DEVICE — PREP BETADINE SPONGE STICKS

## (undated) DEVICE — MEDICINE CUP WITH LID 60ML

## (undated) DEVICE — DRAPE 3/4 SHEET 52X76"

## (undated) DEVICE — GLV 7.5 PROTEXIS (BLUE)

## (undated) DEVICE — SYR ASEPTO

## (undated) DEVICE — ELCTR BOVIE PENCIL SMOKE EVACUATION

## (undated) DEVICE — BASIN SET SINGLE

## (undated) DEVICE — TUBING TUR 2 PRONG

## (undated) DEVICE — ABDOMINAL BINDER MED/LG 9" X 36"-64"

## (undated) DEVICE — GLV 7 PROTEXIS (WHITE)

## (undated) DEVICE — SUT VICRYL 1 18" TIES UNDYED

## (undated) DEVICE — WARMING BLANKET UPPER ADULT

## (undated) DEVICE — DRSG TELFA 3 X 8

## (undated) DEVICE — DRSG MEDIPORE + PAD 3.5X10"

## (undated) DEVICE — POSITIONER FOAM EGG CRATE ULNAR 2PCS (PINK)

## (undated) DEVICE — POSITIONER PINK PAD PIGAZZI SYSTEM

## (undated) DEVICE — LIGASURE IMPACT

## (undated) DEVICE — DRAPE FLUID WARMER 44 X 44"

## (undated) DEVICE — ELCTR BOVIE BLADE 3/4" EXTENDED LENGTH 6"

## (undated) DEVICE — ABDOMINAL BINDER MED/LG 12" X 36"-54"

## (undated) DEVICE — VENODYNE/SCD SLEEVE CALF MEDIUM

## (undated) DEVICE — CANISTER DISPOSABLE THIN WALL 3000CC

## (undated) DEVICE — SUT MAXON 1 60" T-60

## (undated) DEVICE — ELCTR REM POLYHESIVE ADULT PT RETURN 15FT

## (undated) DEVICE — SOL IRR BAG H2O 1000ML

## (undated) DEVICE — DRSG MASTISOL

## (undated) DEVICE — SUT VICRYL 3-0 27" SH UNDYED

## (undated) DEVICE — SUT VICRYL 1 36" CT-1 UNDYED

## (undated) DEVICE — DRSG STERISTRIPS 0.5 X 4"

## (undated) DEVICE — SUCTION YANKAUER NO CONTROL VENT

## (undated) DEVICE — SUT QUILL MONODERM 3-0 30CM PS-2

## (undated) DEVICE — DRAPE INSTRUMENT POUCH 6.75" X 11"

## (undated) DEVICE — PACK MAJOR ABDOMINAL WITH LAP

## (undated) DEVICE — DRAPE MAGNETIC INSTRUMENT MEDIUM

## (undated) DEVICE — GOWN LG

## (undated) DEVICE — LAP PAD W RING 18 X 18"

## (undated) DEVICE — SUT VICRYL 2-0 27" CT-2 UNDYED

## (undated) DEVICE — SOL IRR POUR NS 0.9% 500ML